# Patient Record
Sex: FEMALE | Race: WHITE | URBAN - METROPOLITAN AREA
[De-identification: names, ages, dates, MRNs, and addresses within clinical notes are randomized per-mention and may not be internally consistent; named-entity substitution may affect disease eponyms.]

---

## 2017-05-15 DIAGNOSIS — I10 HYPERTENSION GOAL BP (BLOOD PRESSURE) < 140/90: ICD-10-CM

## 2017-05-15 RX ORDER — ATENOLOL 50 MG/1
50 TABLET ORAL DAILY
Qty: 30 TABLET | Refills: 0 | Status: SHIPPED | OUTPATIENT
Start: 2017-05-15 | End: 2017-07-06

## 2017-05-15 NOTE — TELEPHONE ENCOUNTER
Atenolol 50mg      Last Written Prescription Date: 03/06/2017  Last Fill Quantity: 30, # refills: 0  Last Office Visit with G, P or [facility] prescribing provider: Changing to new clinic  Next 5 appointments (look out 90 days)     Jun 08, 2017 11:00 AM CDT   [facility]    [address] [facility] [id number] [phone]                   Potassium   Date Value Ref Range Status   07/19/2012 4.4 3.4 - 5.3 mmol/L Final     Creatinine   Date Value Ref Range Status   07/19/2012 0.94 0.52 - 1.04 mg/dL Final     BP Readings from Last 3 Encounters:   08/03/11 129/86   06/16/11 125/74   09/16/10 150/80     * [facility]   Ext #[phone], [phone]  #2

## 2017-05-15 NOTE — TELEPHONE ENCOUNTER
Refilled for 1 month as patient has not been seen within [facility] since 2012  Can refill with 3 month supply when patient seen at clinic appt June 8

## 2017-06-06 ENCOUNTER — TELEPHONE (OUTPATIENT)
Dept: FAMILY MEDICINE | Facility: CLINIC | Age: 67
End: 2017-06-06

## 2017-06-06 NOTE — TELEPHONE ENCOUNTER
Attempted to call patient at [phone] (home), no answer.  Left detailed message below, as advised in original message, and to call back to the RN line if any questions.    Gregoria Pace RN  [facility]

## 2017-06-06 NOTE — TELEPHONE ENCOUNTER
I'm not sure what is in the medication. If it's acetaminophen, then it's ok to take. That is a good medication for arthritic pain. Do not take more than 3,000 mg acetaminophen in a 24 hour period

## 2017-06-06 NOTE — TELEPHONE ENCOUNTER
Reason for Call:  Other medication question    Detailed comments: patient has questions regarding over the counter medication for arthritis pain, Arthritis Pain (patient bought at [facility] pharmacy). Patient wants to know if this is ok to take.    Phone Number Patient can be reached at: Home number on file [phone] (home)    Best Time: any    Can we leave a detailed message on this number? YES    Call taken on 6/6/2017 at 11:11 AM by Bruna Gutierrez

## 2017-06-06 NOTE — TELEPHONE ENCOUNTER
Reason for Call:  Other   Business Card    Detailed comments:  Patient called and states she has an appointment on 07/13/17 with Marie Webb.  Patient is requesting a business card for Marie be mailed to her home address.  Jennifer Butler  [address] [place] [address] [address]  [facility] [address]    Phone Number Patient can be reached at: Home number on file [phone] (home)    Best Time: Any    Can we leave a detailed message on this number? YES    Call taken on 6/6/2017 at 11:42 AM by Kat Campos

## 2017-06-06 NOTE — TELEPHONE ENCOUNTER
Patient called back without listening to her voice mail.  This is Tylenol.  RN informed of message as below, she agrees with plan of care.    Gregoria Pace RN  [facility]

## 2017-07-03 ENCOUNTER — TELEPHONE (OUTPATIENT)
Dept: FAMILY MEDICINE | Facility: CLINIC | Age: 67
End: 2017-07-03

## 2017-07-03 DIAGNOSIS — I10 HYPERTENSION GOAL BP (BLOOD PRESSURE) < 140/90: ICD-10-CM

## 2017-07-03 NOTE — TELEPHONE ENCOUNTER
atenolol (TENORMIN) 50 MG tablet      Last Written Prescription Date: 5-15-17  Last Fill Quantity: 30, # refills: 0    Last Office Visit with [facility], [facility] or [facility] prescribing provider:  ?   Future Office Visit:    Next 5 appointments (look out 90 days)     Aug 17, 2017 11:00 AM CDT   Office Visit with ABI Avery CNP   [facility] ([facility]    [address] [place] [id number] [phone]                    BP Readings from Last 3 Encounters:   08/03/11 129/86   06/16/11 125/74   09/16/10 150/80

## 2017-07-03 NOTE — TELEPHONE ENCOUNTER
Routing refill request to provider for review/approval because:  Janey given x1 and patient did not follow up, please advise    Betty Duran RN  [facility]

## 2017-07-05 RX ORDER — ATENOLOL 50 MG/1
TABLET ORAL
Qty: 30 TABLET | Refills: 0 | OUTPATIENT
Start: 2017-07-05

## 2017-07-06 RX ORDER — ATENOLOL 50 MG/1
50 TABLET ORAL DAILY
Qty: 45 TABLET | Refills: 0 | Status: SHIPPED | OUTPATIENT
Start: 2017-07-06 | End: 2017-09-26

## 2017-07-06 NOTE — TELEPHONE ENCOUNTER
Patient is scheduled 08/17/2017 for a check and would like enough medication until appointment. Bridget HamiltonTC

## 2017-07-13 DIAGNOSIS — R07.9 CHEST PAIN, UNSPECIFIED TYPE: Primary | ICD-10-CM

## 2017-07-13 RX ORDER — NITROGLYCERIN 0.4 MG/1
0.4 TABLET SUBLINGUAL EVERY 5 MIN PRN
Qty: 25 TABLET | Refills: 0 | Status: SHIPPED | OUTPATIENT
Start: 2017-07-13 | End: 2018-05-29

## 2017-07-13 NOTE — TELEPHONE ENCOUNTER
Nitrostat 0.4mg subl      Last Written Prescription Date:  06/16/11  Last Fill Quantity: 25,   # refills: 0  Last Office Visit with G, P or [facility] prescribing provider: 10/03/12  Future Office visit:    Next 5 appointments (look out 90 days)     Aug 17, 2017 11:00 AM CDT   Office Visit with ABI Avery CNP   [facility]    [address] [place] [id number] [phone]                   Routing refill request to provider for review/approval because:  Medication is reported/historical    On behalf of [facility]  Thank You~  Ana Black CPhT  [facility]

## 2017-08-02 ENCOUNTER — TELEPHONE (OUTPATIENT)
Dept: FAMILY MEDICINE | Facility: CLINIC | Age: 67
End: 2017-08-02

## 2017-08-02 DIAGNOSIS — I10 HYPERTENSION GOAL BP (BLOOD PRESSURE) < 140/90: Primary | ICD-10-CM

## 2017-08-02 NOTE — TELEPHONE ENCOUNTER
I can not refill again. I have never met her. Most recent office visit and labs are from 2012. She currently has enough atenolol to get through until mid August and should be able to schedule an appointment within that time frame. Otherwise, she could request a refill from previous clinic that she was receiving this prescription from.

## 2017-08-02 NOTE — TELEPHONE ENCOUNTER
Patient changed her appointment to 9/26. Please see last refill notes. Routing to PCP. Are you willing to refill again?   Carmel Maynard RN

## 2017-08-02 NOTE — TELEPHONE ENCOUNTER
Reason for Call:  Medication or medication refill:    Do you use a [facility]?  Name of the pharmacy and phone number for the current request:  [facility]     Name of the medication requested: Atenolol.  Patient states she only needs 20 tablets to get her through until her next appointment with Marie.  She changed her appointment to Tuesday, 9/26/17.      Other request: Please call the patient if there is a problem with her request.    Can we leave a detailed message on this number? YES    Phone number patient can be reached at: Cell number on file:    Telephone Information:   Mobile [phone]       Best Time: anytime    Call taken on 8/2/2017 at 9:44 AM by Lili Vale

## 2017-08-02 NOTE — TELEPHONE ENCOUNTER
Called patient and relayed below information - patient states has enough to get her through till her appoinment on the 26 th.    Margaret Morillo RN  [facility]

## 2017-09-26 ENCOUNTER — TELEPHONE (OUTPATIENT)
Dept: FAMILY MEDICINE | Facility: CLINIC | Age: 67
End: 2017-09-26

## 2017-09-26 RX ORDER — ATENOLOL 50 MG/1
50 TABLET ORAL DAILY
Qty: 14 TABLET | Refills: 0 | Status: SHIPPED | OUTPATIENT
Start: 2017-09-26 | End: 2017-10-13

## 2017-09-26 NOTE — TELEPHONE ENCOUNTER
Patient calling back regarding refill request. She only has enough medication to last until tomorrow. Patient is wondering if there is another provider that can refill this medication as she needs to have a  deliver her medication. Patient would like a phone call once refill has been addressed at [phone].

## 2017-09-26 NOTE — TELEPHONE ENCOUNTER
Called and spoke with patient, advised of message as below.  She agrees with and understands plan of care.      Gregoria Pace RN  [facility]

## 2017-09-26 NOTE — TELEPHONE ENCOUNTER
I sent 2 weeks supply to our clinic's pharmacy. I apologize that I had to cancel the appointment today. I will see her on the 9th of October. Please bring any recent medical records that you have to that appointment

## 2017-09-26 NOTE — TELEPHONE ENCOUNTER
Reason for call:  Patient reporting a symptom    Symptom or request: Patient has been having pain from her arthritis. Patient is wondering if it is safe to take both Tylenol and Ibuprofen to help treat the achiness. Patient currently only takes Tylenol as needed and waits awhile after taking her aspirin.     Duration (how long have symptoms been present): ongoing    Have you been treated for this before? No    Additional comments: Patient uses [facility]    Phone Number patient can be reached at:  Cell number on file:    Telephone Information:   Mobile [phone]       Best Time:  any    Can we leave a detailed message on this number:  YES    Call taken on 9/26/2017 at 10:11 AM by Gracy Ortiz

## 2017-09-26 NOTE — TELEPHONE ENCOUNTER
TC contacted patient and had to cancel patient's appointment today due to provider being out. Patient rescheduled her appointment for 10/09/2017. Patient will run out of this medication prior to appointment and is requesting that medication be refilled. She would like script sent to [facility]. She can be reached at home number listed with any questions.

## 2017-10-13 ENCOUNTER — OFFICE VISIT (OUTPATIENT)
Dept: FAMILY MEDICINE | Facility: CLINIC | Age: 67
End: 2017-10-13
Payer: COMMERCIAL

## 2017-10-13 ENCOUNTER — TELEPHONE (OUTPATIENT)
Dept: FAMILY MEDICINE | Facility: CLINIC | Age: 67
End: 2017-10-13

## 2017-10-13 VITALS
HEIGHT: 62 IN | TEMPERATURE: 97.6 F | OXYGEN SATURATION: 97 % | WEIGHT: 283 LBS | HEART RATE: 83 BPM | BODY MASS INDEX: 52.08 KG/M2 | DIASTOLIC BLOOD PRESSURE: 84 MMHG | SYSTOLIC BLOOD PRESSURE: 156 MMHG

## 2017-10-13 DIAGNOSIS — L60.2 HYPERTROPHY OF NAIL: ICD-10-CM

## 2017-10-13 DIAGNOSIS — Z12.31 VISIT FOR SCREENING MAMMOGRAM: ICD-10-CM

## 2017-10-13 DIAGNOSIS — Z00.00 HEALTH CARE MAINTENANCE: ICD-10-CM

## 2017-10-13 DIAGNOSIS — E78.5 HYPERLIPIDEMIA LDL GOAL <100: ICD-10-CM

## 2017-10-13 DIAGNOSIS — E11.9 TYPE 2 DIABETES MELLITUS WITHOUT COMPLICATION, WITHOUT LONG-TERM CURRENT USE OF INSULIN (H): ICD-10-CM

## 2017-10-13 DIAGNOSIS — Z11.59 NEED FOR HEPATITIS C SCREENING TEST: ICD-10-CM

## 2017-10-13 DIAGNOSIS — I10 HYPERTENSION, GOAL BELOW 140/90: Primary | ICD-10-CM

## 2017-10-13 DIAGNOSIS — I21.4 NSTEMI (NON-ST ELEVATED MYOCARDIAL INFARCTION) (H): ICD-10-CM

## 2017-10-13 DIAGNOSIS — I87.2 VENOUS STASIS DERMATITIS OF BOTH LOWER EXTREMITIES: ICD-10-CM

## 2017-10-13 LAB
ANION GAP SERPL CALCULATED.3IONS-SCNC: 7 MMOL/L (ref 3–14)
BUN SERPL-MCNC: 15 MG/DL (ref 7–30)
CALCIUM SERPL-MCNC: 9.2 MG/DL (ref 8.5–10.1)
CHLORIDE SERPL-SCNC: 105 MMOL/L (ref 94–109)
CHOLEST SERPL-MCNC: 226 MG/DL
CO2 SERPL-SCNC: 26 MMOL/L (ref 20–32)
CREAT SERPL-MCNC: 1.06 MG/DL (ref 0.52–1.04)
GFR SERPL CREATININE-BSD FRML MDRD: 51 ML/MIN/1.7M2
GLUCOSE SERPL-MCNC: 136 MG/DL (ref 70–99)
HBA1C MFR BLD: 6.3 % (ref 4.3–6)
HDLC SERPL-MCNC: 50 MG/DL
HGB BLD-MCNC: 15.4 G/DL (ref 11.7–15.7)
LDLC SERPL CALC-MCNC: 131 MG/DL
NONHDLC SERPL-MCNC: 176 MG/DL
POTASSIUM SERPL-SCNC: 4.1 MMOL/L (ref 3.4–5.3)
SODIUM SERPL-SCNC: 138 MMOL/L (ref 133–144)
TRIGL SERPL-MCNC: 227 MG/DL
TSH SERPL DL<=0.005 MIU/L-ACNC: 2.08 MU/L (ref 0.4–4)

## 2017-10-13 PROCEDURE — 99207 C FOOT EXAM  NO CHARGE: CPT | Performed by: NURSE PRACTITIONER

## 2017-10-13 PROCEDURE — 36415 COLL VENOUS BLD VENIPUNCTURE: CPT | Performed by: NURSE PRACTITIONER

## 2017-10-13 PROCEDURE — 84443 ASSAY THYROID STIM HORMONE: CPT | Performed by: NURSE PRACTITIONER

## 2017-10-13 PROCEDURE — 80048 BASIC METABOLIC PNL TOTAL CA: CPT | Performed by: NURSE PRACTITIONER

## 2017-10-13 PROCEDURE — 85018 HEMOGLOBIN: CPT | Performed by: NURSE PRACTITIONER

## 2017-10-13 PROCEDURE — 80061 LIPID PANEL: CPT | Performed by: NURSE PRACTITIONER

## 2017-10-13 PROCEDURE — 83036 HEMOGLOBIN GLYCOSYLATED A1C: CPT | Performed by: NURSE PRACTITIONER

## 2017-10-13 PROCEDURE — 86803 HEPATITIS C AB TEST: CPT | Performed by: NURSE PRACTITIONER

## 2017-10-13 PROCEDURE — 99204 OFFICE O/P NEW MOD 45 MIN: CPT | Performed by: NURSE PRACTITIONER

## 2017-10-13 RX ORDER — HYDROCHLOROTHIAZIDE 12.5 MG/1
12.5 TABLET ORAL DAILY
Qty: 30 TABLET | Refills: 0 | Status: SHIPPED | OUTPATIENT
Start: 2017-10-13 | End: 2017-11-15

## 2017-10-13 RX ORDER — PRAVASTATIN SODIUM 40 MG
40 TABLET ORAL DAILY
Qty: 30 TABLET | Refills: 0 | Status: SHIPPED | OUTPATIENT
Start: 2017-10-13 | End: 2017-11-15

## 2017-10-13 RX ORDER — ATENOLOL 50 MG/1
50 TABLET ORAL DAILY
Qty: 30 TABLET | Refills: 0 | Status: SHIPPED | OUTPATIENT
Start: 2017-10-13 | End: 2017-11-15

## 2017-10-13 ASSESSMENT — PAIN SCALES - GENERAL: PAINLEVEL: NO PAIN (0)

## 2017-10-13 NOTE — TELEPHONE ENCOUNTER
She does not need to check her blood sugars this week  We can discuss this at her visit next week  Her A1c was at goal which means her diabetes is well controlled through diet

## 2017-10-13 NOTE — TELEPHONE ENCOUNTER
"Called and spoke with patient, advised of message as below.  She first states that she doesn't have diabetes, RN informed her she still does but is well controlled through diet.  Patient then goes on to list the meal she had last night, that she will eat only 1 slice of bread instead of 2, and plans to lose 20-30 pounds by next year.      She then requests 90 days of Atenolol.  RN informed there is a shortage so even if we sent 90 days the pharmacy might only supply 30.  Also her insurance would not pay for it right now.  PCP informed RN via [facility] that she wants patient to f/up and will not give 90 days.    She states that the HCTZ has really decreased her swelling and she is \"prancing around the house.\"    RN confirmed patient will be in for follow up, changed appointment to 40 min appointment as patient has really involved cares and does not seem to understand things the first time around.    Gregoria Pace RN  [facility]    "

## 2017-10-13 NOTE — TELEPHONE ENCOUNTER
Reason for Call:  Other call back    Detailed comments: Patient calling to discuss her diabetes diagnosis, medications, and lab results. She is worried she wont get her medication on time and says she can just use her husbands diabetes reader to prick her finger to get a reading.   Phone Number Patient can be reached at: Home number on file [phone] (home)    Best Time: any     Can we leave a detailed message on this number? YES    Call taken on 10/13/2017 at 2:02 PM by Yulissa Eller

## 2017-10-13 NOTE — MR AVS SNAPSHOT
After Visit Summary   10/13/2017    Deanna Butler    MRN: [id number]           Patient Information     Date Of Birth          1950        Visit Information        Provider Department      10/13/2017 10:40 AM Marie Webb APRN [facility]        Today's Diagnoses     Hypertension, goal below 140/90    -  1    NSTEMI (non-ST elevated myocardial infarction) (H)        Asymptomatic postmenopausal status        Visit for screening mammogram        Need for hepatitis C screening test        At risk for falling        Screening for diabetic peripheral neuropathy        Type 2 diabetes mellitus without complication, without long-term current use of insulin (H)        Hyperlipidemia LDL goal <100        Health care maintenance        Hypertrophy of nail        Venous stasis dermatitis of both lower extremities        Hypertension goal BP (blood pressure) < 140/90          Care Instructions    Start taking Pravachol at bedtime for your cholesterol  I started a new medication called Hydrochlorothiazide. Take 1 tablet daily in the morning with your atenolol  Follow up in 1 week    Schedule appointment with podiatrist          Follow-ups after your visit        Additional Services     PODIATRY/FOOT & ANKLE SURGERY REFERRAL       Your provider has referred you to: [facility]: [place] [facility] [phone]   [email or web address]    Please be aware that coverage of these services is subject to the terms and limitations of your health insurance plan.  Call member services at your health plan with any benefit or coverage questions.      Please bring the following to your appointment:  >>   Any x-rays, CTs or MRIs which have been performed.  Contact the facility where they were done to arrange for  prior to your scheduled appointment.    >>   List of current medications   >>   This referral request   >>   Any  "documents/labs given to you for this referral                  Future tests that were ordered for you today     Open Future Orders        Priority Expected Expires Ordered    MA SCREENING DIGITAL BILAT - Future  (s+30) Routine  10/13/2018 10/13/2017            Who to contact     If you have questions or need follow up information about today's clinic visit or your schedule please contact [facility] directly at [phone].  Normal or non-critical lab and imaging results will be communicated to you by i[employer] CRM[employer], letter or phone within 4 business days after the clinic has received the results. If you do not hear from us within 7 days, please contact the clinic through [employer] or phone. If you have a critical or abnormal lab result, we will notify you by phone as soon as possible.  Submit refill requests through [employer] or call your pharmacy and they will forward the refill request to us. Please allow 3 business days for your refill to be completed.          Additional Information About Your Visit        iConnect CRMharLoudr Information     [employer] gives you secure access to your electronic health record. If you see a primary care provider, you can also send messages to your care team and make appointments. If you have questions, please call your primary care clinic.  If you do not have a primary care provider, please call [phone] and they will assist you.        Care EveryWhere ID     This is your Care EveryWhere ID. This could be used by other organizations to access your [facility] medical records  [id number]        Your Vitals Were     Pulse Temperature Height Pulse Oximetry Breastfeeding? BMI (Body Mass Index)    83 97.6  F (36.4  C) (Oral) 5' 2\" (1.575 m) 97% No 51.76 kg/m2       Blood Pressure from Last 3 Encounters:   10/13/17 156/84   08/03/11 129/86   06/16/11 125/74    Weight from Last 3 Encounters:   10/13/17 283 lb (128.4 kg)   06/16/11 236 lb (107 kg)   09/16/10 238 lb (108 kg)            "   We Performed the Following     Albumin Random Urine Quantitative with Creat Ratio     BASIC METABOLIC PANEL     FOOT EXAM  NO CHARGE [00775.114]     HEMOGLOBIN A1C     Hemoglobin     Hepatitis C Screen Reflex to HCV RNA Quant and Genotype     LIPID PANEL     PODIATRY/FOOT & ANKLE SURGERY REFERRAL     TSH WITH FREE T4 REFLEX          Today's Medication Changes          These changes are accurate as of: 10/13/17 10:45 AM.  If you have any questions, ask your nurse or doctor.               Start taking these medicines.        Dose/Directions    hydrochlorothiazide 12.5 MG Tabs tablet   Used for:  Hypertension, goal below 140/90   Started by:  Marie Webb APRN CNP        Dose:  12.5 mg   Take 1 tablet (12.5 mg) by mouth daily   Quantity:  30 tablet   Refills:  0         These medicines have changed or have updated prescriptions.        Dose/Directions    aspirin  MG EC tablet   This may have changed:  Another medication with the same name was removed. Continue taking this medication, and follow the directions you see here.   Used for:  Type 2 diabetes mellitus without complication, without long-term current use of insulin (H)   Changed by:  Marie Webb APRN CNP        Dose:  325 mg   Take 1 tablet (325 mg) by mouth daily   Quantity:  90 tablet   Refills:  3            Where to get your medicines      These medications were sent to [facility] [address]  [address] [facility] [address]     Phone:  [phone]     atenolol 50 MG tablet    hydrochlorothiazide 12.5 MG Tabs tablet    pravastatin 40 MG tablet                Primary Care Provider Office Phone # Fax #    ABI Avery [facility] [phone]       [address] [facility] [address]        Equal Access to Services     MARAH FERRER AH: Vi Harrell, maria eugenia swift, cheyenne gastelum  pola schmidtsugar laKiaraaan ah. So [facility] [phone].    ATENCIÓN: Si habla imtiaz, tiene a carlisle disposición servicios gratuitos de asistencia lingüística. Toya marroquin [phone].    We comply with applicable federal civil rights laws and [place] laws. We do not discriminate on the basis of race, color, national origin, age, disability, sex, sexual orientation, or gender identity.            Thank you!     Thank you for choosing [facility]  for your care. Our goal is always to provide you with excellent care. Hearing back from our patients is one way we can continue to improve our services. Please take a few minutes to complete the written survey that you may receive in the mail after your visit with us. Thank you!             Your Updated Medication List - Protect others around you: Learn how to safely use, store and throw away your medicines at www.disposemymeds.org.          This list is accurate as of: 10/13/17 10:45 AM.  Always use your most recent med list.                   Brand Name Dispense Instructions for use Diagnosis    aspirin  MG EC tablet     90 tablet    Take 1 tablet (325 mg) by mouth daily    Type 2 diabetes mellitus without complication, without long-term current use of insulin (H)       atenolol 50 MG tablet    TENORMIN    30 tablet    Take 1 tablet (50 mg) by mouth daily    Hypertension goal BP (blood pressure) < 140/90       cetirizine 10 MG tablet    zyrTEC    90 tablet    Take 1 tablet by mouth every evening.    Cough       cholecalciferol 400 UNIT Tabs tablet    vitamin D     Take 400 Units by mouth daily.        fish oil-omega-3 fatty acids 1000 MG capsule      Take 2 g by mouth daily.        hydrochlorothiazide 12.5 MG Tabs tablet     30 tablet    Take 1 tablet (12.5 mg) by mouth daily    Hypertension, goal below 140/90       nitroGLYcerin 0.4 MG sublingual tablet    NITROSTAT    25 tablet    Place 1 tablet (0.4 mg) under the tongue every 5 minutes as needed    Chest  pain, unspecified type       pravastatin 40 MG tablet    PRAVACHOL    30 tablet    Take 1 tablet (40 mg) by mouth daily    Hyperlipidemia LDL goal <100, Type 2 diabetes mellitus without complication, without long-term current use of insulin (H)

## 2017-10-13 NOTE — NURSING NOTE
"Chief Complaint   Patient presents with     Hypertension       Initial /80 (BP Location: Right arm, Patient Position: Chair, Cuff Size: Adult Large)  Pulse 83  Temp 97.6  F (36.4  C) (Oral)  Ht 5' 2\" (1.575 m)  Wt 283 lb (128.4 kg)  SpO2 97%  Breastfeeding? No  BMI 51.76 kg/m2 Estimated body mass index is 51.76 kg/(m^2) as calculated from the following:    Height as of this encounter: 5' 2\" (1.575 m).    Weight as of this encounter: 283 lb (128.4 kg).  Medication Reconciliation: complete.  JOHNNA Guillen MA      "

## 2017-10-13 NOTE — PATIENT INSTRUCTIONS
Start taking Pravachol at bedtime for your cholesterol  I started a new medication called Hydrochlorothiazide. Take 1 tablet daily in the morning with your atenolol  Follow up in 1 week    Schedule appointment with podiatrist

## 2017-10-13 NOTE — PROGRESS NOTES
SUBJECTIVE:   Deanna Butler is a 67 year old female who presents to clinic today for the following health issues:      Hypertension Follow-up      Outpatient blood pressures are not being checked.    Low Salt Diet: not monitoring salt    Amount of exercise or physical activity: 4-5 days/week for an average of less than 15 minutes    Problems taking medications regularly: No    Medication side effects: none  Diet: regular (no restrictions)    She was previously following at [facility] and would like to transfer care here    She is trying to restrict her sodium use  Is trying to lose weight. Goal weight loss of 40 pounds    She reports she has been taking atenolol though her last refill was 9/26/17 for 14 days and she would have run out by now  I am not sure where she was receiving atenolol over past few years    She denies history of history of diabetes  A1c 6.8 in 2012    NSTEMI 2011  She is no longer on Plavis  Reports taking 325 mg aspirin daily    She denies any concerns      Problem list and histories reviewed & adjusted, as indicated.  Additional history: none    Patient Active Problem List   Diagnosis     CAD (coronary artery disease)     Hyperlipidemia LDL goal <100     CKD (chronic kidney disease) stage 3, GFR 30-59 ml/min     Hypertension goal BP (blood pressure) < 140/90     Type 2 diabetes, HbA1C goal < 8% (H)     History reviewed. No pertinent surgical history.    Social History   Substance Use Topics     Smoking status: Former Smoker     Quit date: 9/16/1980     Smokeless tobacco: Never Used     Alcohol use No      Comment: occasional wine     Family History   Problem Relation Age of Onset     Hypertension Mother      Arthritis Mother      CANCER Father      CANCER Brother      HEART DISEASE Sister          Recent Labs   Lab Test  10/13/17   1055  07/19/12   1031  09/16/10   1150   A1C  6.3*  6.8*  6.5*   LDL   --   72  147*   HDL   --   47*  49*   TRIG   --   179*  183*   ALT   --   43  45   CR   --  "  0.94  1.06*   GFRESTIMATED   --   60*  53*   GFRESTBLACK   --   73  64   POTASSIUM   --   4.4  4.5   TSH   --   1.71  1.49      BP Readings from Last 3 Encounters:   10/13/17 156/84   08/03/11 129/86   06/16/11 125/74    Wt Readings from Last 3 Encounters:   10/13/17 283 lb (128.4 kg)   06/16/11 236 lb (107 kg)   09/16/10 238 lb (108 kg)             Reviewed and updated as needed this visit by clinical staffTobacco  Meds  Med Hx  Surg Hx  Fam Hx  Soc Hx      Reviewed and updated as needed this visit by Provider         ROS:  Constitutional, HEENT, cardiovascular, pulmonary, gi and gu systems are negative, except as otherwise noted.      OBJECTIVE:   /84  Pulse 83  Temp 97.6  F (36.4  C) (Oral)  Ht 5' 2\" (1.575 m)  Wt 283 lb (128.4 kg)  SpO2 97%  Breastfeeding? No  BMI 51.76 kg/m2  Body mass index is 51.76 kg/(m^2).  GENERAL: healthy, alert and no distress  NECK: no adenopathy, no asymmetry, masses, or scars and thyroid normal to palpation  RESP: lungs clear to auscultation - no rales, rhonchi or wheezes  CV: regular rate and rhythm, normal S1 S2, no S3 or S4, no murmur, click or rub, moderate non pitting edema and peripheral pulses strong  ABDOMEN: soft, nontender, without hepatosplenomegaly or masses and bowel sounds normal  MS: gait steady and age appropriate  SKIN: venous stasis changes bilateral lower extremities  NEURO: Normal strength and tone, mentation intact and speech normal  PSYCH: affect normal/bright and judgement and insight impaired  Foot exam - bilateral: Color and temperature is normal. Sensation is intact. Peripheral pulses are palpable. Lengthened, hypertrophic toenails  Wearing hospital socks and fleece slippers. Unable to reapply socks and slippers independently      Diagnostic Test Results:  none     ASSESSMENT/PLAN:   1. Hypertension, goal below 140/90  - Not at goal. I question her adherence to atenolol. She reports taking daily though per our records she should have run " out. Will add diuretic for improved BP control and for edema. Follow up in 1 week  - BASIC METABOLIC PANEL  - Hemoglobin  - hydrochlorothiazide 12.5 MG TABS tablet; Take 1 tablet (12.5 mg) by mouth daily  Dispense: 30 tablet; Refill: 0  - atenolol (TENORMIN) 50 MG tablet; Take 1 tablet (50 mg) by mouth daily  Dispense: 30 tablet; Refill: 0    2. Type 2 diabetes mellitus without complication, without long-term current use of insulin (H)  - She denies history of diabetes. I reviewed past labs. She is currently not on any medications for diabetes. Will recheck A1c, add statin. Discussed risks and benefits of statin  - FOOT EXAM  NO CHARGE [86520.114]  - BASIC METABOLIC PANEL  - HEMOGLOBIN A1C  - aspirin  MG EC tablet; Take 1 tablet (325 mg) by mouth daily  Dispense: 90 tablet; Refill: 3  - pravastatin (PRAVACHOL) 40 MG tablet; Take 1 tablet (40 mg) by mouth daily  Dispense: 30 tablet; Refill: 0  - Albumin Random Urine Quantitative with Creat Ratio; Future    3. NSTEMI (non-ST elevated myocardial infarction) (H)  - 2011. Currently asymptomatic. Does not exercise  - Advised regular exercise. She is taking baby aspirin. Will add statin. Previous records not available. She reports seeing cardiology in the past. I don't see record of this. Recommend cardiology consult. She declines at the moment. It appears as though she has not been following within a health system for several years. Will need to address measures slowly as she appears very overwhelmed    4. Visit for screening mammogram  - MA SCREENING DIGITAL BILAT - Future  (s+30); Future    5. Need for hepatitis C screening test  - Hepatitis C Screen Reflex to HCV RNA Quant and Genotype    6. Hyperlipidemia LDL goal <100  - LIPID PANEL  - pravastatin (PRAVACHOL) 40 MG tablet; Take 1 tablet (40 mg) by mouth daily  Dispense: 30 tablet; Refill: 0    7. Health care maintenance  - TSH WITH FREE T4 REFLEX    8. Hypertrophy of nail  - Referred to [facility] by [facility].  Business card given    9. Venous stasis dermatitis of both lower extremities  - Discussed importance of exercise, healthy diet, medication adherence, elevation and sodium restriction      Patient Instructions   Start taking Pravachol at bedtime for your cholesterol  I started a new medication called Hydrochlorothiazide. Take 1 tablet daily in the morning with your atenolol  Follow up in 1 week    Schedule appointment with podiatrist    More than 45 minutes spent with patient, more than 50% of which was spent on counseling and coordination of care regarding the above measures    ABI Bautista [facility]

## 2017-10-13 NOTE — TELEPHONE ENCOUNTER
RN sees A1c is resulted but not reviewed.  Metformin is listed on patient's med list, but last taken in 2011.  Was diabetes discussed at office visit today?    Routed to PCP.    Gregoria Pace RN  [facility]

## 2017-10-15 LAB — HCV AB SERPL QL IA: NONREACTIVE

## 2017-10-16 ENCOUNTER — TELEPHONE (OUTPATIENT)
Dept: FAMILY MEDICINE | Facility: CLINIC | Age: 67
End: 2017-10-16

## 2017-10-16 PROBLEM — I87.2 VENOUS STASIS DERMATITIS OF BOTH LOWER EXTREMITIES: Status: ACTIVE | Noted: 2017-10-16

## 2017-10-16 NOTE — TELEPHONE ENCOUNTER
Marie - please call her.  I already discussed all of this with her, in great detail, in the previous encounter.    Gregoria Pace RN  [facility]

## 2017-10-16 NOTE — TELEPHONE ENCOUNTER
She does need the medications for hypertension: atenolol and HCTZ. I will send in 90 day supplies once her symptoms are well controlled. Until then, we will do 30 days at a time  We can discuss the rest of her concerns on Friday. She was advised at our clinic visit to follow up in 1 week.   I will not be giving additional refills of medications without followup

## 2017-10-16 NOTE — TELEPHONE ENCOUNTER
Called and spoke with patient.  Informed of this and reviewed last Friday's conversation with her.  Patient verbalized understanding of this, this time.  She did verbally tangent off on her medications and how they make her feel better, also discussing foods.  RN advised to make a list of foods and concerns and bring that to her appointment.        Gregoria Pace RN  [facility]

## 2017-10-16 NOTE — TELEPHONE ENCOUNTER
"Reason for Call:  Other call back    Detailed comments: patient states water pill are helping her feel \"less bloated at the feet\" she states she is walking 4 times a day, she fees like is already having results on weight loss. Patient states she is watching her diet, looking more at labels and sodium amounts, but she would like some general guidance on nutritious foods. States she had read that bananas are and aren't good for health, she would like clarification. Patient is also wondering about the BP pills 90 day supply, she states she can pay out of pocket for them. She would also like to know if apt in November is needed.    Phone Number Patient can be reached at: Home number on file [phone] (home)    Best Time: anytime    Can we leave a detailed message on this number? YES    Call taken on 10/16/2017 at 8:43 AM by Mary Perez    "

## 2017-10-20 ENCOUNTER — TELEPHONE (OUTPATIENT)
Dept: FAMILY MEDICINE | Facility: CLINIC | Age: 67
End: 2017-10-20

## 2017-10-20 DIAGNOSIS — I10 HYPERTENSION GOAL BP (BLOOD PRESSURE) < 140/90: Primary | ICD-10-CM

## 2017-10-20 NOTE — TELEPHONE ENCOUNTER
Reason for Call:  Other appointment    Detailed comments: Patient states that she is doing great, she goes on walks 4 times a day, is eating healthy, and feels amazing. She says that she has to pay $40 everytime she comes in for an appointment and was hoping that she could get her blood pressure medication refilled without the follow up appointment on 11/10. Please call to discuss.    Phone Number Patient can be reached at: Cell number on file:    Telephone Information:   Mobile [phone]       Best Time: Patient would like to speak with someone today    Can we leave a detailed message on this number? YES    Call taken on 10/20/2017 at 3:02 PM by Gus Benedict

## 2017-10-23 NOTE — TELEPHONE ENCOUNTER
Patient does need to be seen in clinic as diuretic was started at last visit d/t HTN.  Offered for her to do RN check but if BP remained high, would need to see me if BP remained elevated  She stated she preferred to keep appointment on 11/10

## 2017-10-23 NOTE — TELEPHONE ENCOUNTER
Patient calling back to check to see if anyone has called her yet.  Please call her back at number [phone].    Thank you!  Gretchen FRANKLIN  Patient Representative  [facility]

## 2017-10-24 ENCOUNTER — TELEPHONE (OUTPATIENT)
Dept: FAMILY MEDICINE | Facility: CLINIC | Age: 67
End: 2017-10-24

## 2017-10-24 NOTE — TELEPHONE ENCOUNTER
Reason for Call:  Other     Detailed comments: patient called and would like to talk about her cholesterol      Phone Number Patient can be reached at: Home number on file [phone] (home)    Best Time: any    Can we leave a detailed message on this number? YES    Call taken on 10/24/2017 at 8:03 AM by Dayana Nguyne

## 2017-10-24 NOTE — TELEPHONE ENCOUNTER
Called patient. She was wanting the value for her cholesterol:     Cholesterol 226     Triglycerides 227     LDL Cholesterol Calculated 131     Patient states she is taking the Pravastatin 40 mg but is concerned since the medication she received says Pravastatin Sodium. She is concerned that the sodium can affect her heart. Even though she does not have any side effects from the medication she would like to know if she should be concerned about the sodium or if there is another medication she should be on. Patient also says that she has labs ordered but she does not want to have them done. She says she does not have any concerns about her kidney function if that was what the test was for.     Routed to provider to advise.  Adrianna Casey RN  [facility]

## 2017-10-24 NOTE — TELEPHONE ENCOUNTER
Patient called pharmacy and talked to the pharmacist and he said the medication is fine for her to take with her heart problem.    Patient does not needs a phone call back because she got her question answered by the pharmacist.    Jolene Ortega

## 2017-11-02 ENCOUNTER — TELEPHONE (OUTPATIENT)
Dept: FAMILY MEDICINE | Facility: CLINIC | Age: 67
End: 2017-11-02

## 2017-11-02 DIAGNOSIS — I10 HYPERTENSION, GOAL BELOW 140/90: ICD-10-CM

## 2017-11-02 DIAGNOSIS — E78.5 HYPERLIPIDEMIA LDL GOAL <100: ICD-10-CM

## 2017-11-02 DIAGNOSIS — E11.9 TYPE 2 DIABETES MELLITUS WITHOUT COMPLICATION, WITHOUT LONG-TERM CURRENT USE OF INSULIN (H): ICD-10-CM

## 2017-11-02 RX ORDER — PRAVASTATIN SODIUM 40 MG
40 TABLET ORAL DAILY
Qty: 90 TABLET | Refills: 0 | Status: CANCELLED | OUTPATIENT
Start: 2017-11-02

## 2017-11-02 RX ORDER — HYDROCHLOROTHIAZIDE 12.5 MG/1
12.5 TABLET ORAL DAILY
Qty: 90 TABLET | Refills: 0 | Status: CANCELLED | OUTPATIENT
Start: 2017-11-02

## 2017-11-02 RX ORDER — ATENOLOL 50 MG/1
50 TABLET ORAL DAILY
Qty: 90 TABLET | Refills: 0 | Status: CANCELLED | OUTPATIENT
Start: 2017-11-02

## 2017-11-02 NOTE — TELEPHONE ENCOUNTER
I called patient and advised her of Marie's response.   She is arguing with me about why she has to be seen and have labs again.    She keeps telling me she is peeing normally; I keep telling her that peeing is not a good indicator of kidney function and that we need to check for possible electrolyte imbalances due to starting the HCTZ.    She feels we are being unreasonable and she cooperated with the lab work.      She finally agrees to keep her appointment; says she thinks she might not take the pravastatin if her cholesterol is better when she comes back.   I advised her I am not sure she was planning to recheck her cholesterol already.    She says she is exercising and eating low sodium food, eating healthier.   I advised her to have her cholesterol checked at her new doctor.      She also says she is not going to sit on the toilet and leave any pee tests either.   All she will do is the blood test for her kidneys.    She is upset about not getting a call regarding her recent lab work.   I told her the results were likely sent to her MyChart; she says she does not use Mychart and wants this deactivated.    I asked her about the mammogram, she absolutely refuses this, not swayed by promise of free test.      Routed to  to deactivate her Mychart.    Betty Duran RN  [facility]

## 2017-11-02 NOTE — TELEPHONE ENCOUNTER
I will not be refilling these medications  Patient does need to be seen in clinic. We discussed this at her last visit  Her BP has been poorly controlled. We started her on a new medication. She needs to be seen to assess whether BP has improved and to monitor for tolerance to new medications. She should not wait until January to do this  If her blood pressure is well controlled when she comes in on Nov 10, I will give enough in refill until she can be seen by new provider in January  Please also let patient know about [facility] on 11/6 and see if she will schedule for it. It's a free mammogram with giveaways

## 2017-11-02 NOTE — TELEPHONE ENCOUNTER
"        Atenolol, pravastatin, HCTZ     Last Written Prescription Date: 10/13/17 for all  Last Fill Quantity: 30,  # refills: 0   Last Office Visit with G, P or [facility] prescribing provider: 10/13/17, advised to follow up in 1 week, not done, scheduled for 11/10/17 instead                                         Next 5 appointments (look out 90 days)     Nov 10, 2017 10:40 AM CST   [facility] C[facility]    [address] [address]   [phone]                  BP Readings from Last 3 Encounters:   10/13/17 156/84   08/03/11 129/86   06/16/11 125/74     Creatinine   Date Value Ref Range Status   10/13/2017 1.06 (H) 0.52 - 1.04 mg/dL Final   ]  Potassium   Date Value Ref Range Status   10/13/2017 4.1 3.4 - 5.3 mmol/L Final   ]  LDL Cholesterol Calculated   Date Value Ref Range Status   10/13/2017 131 (H) <100 mg/dL Final     Comment:     Above desirable:  100-129 mg/dl  Borderline High:  130-159 mg/dL  High:             160-189 mg/dL  Very high:       >189 mg/dl     ]    She should not run out of her meds until then.  I called patient to discuss, she says she is planning to switch clinics after Jan 1st.  She is hoping she won't have to be seen on Nov 10 as she is going to be seen elsewhere in 2018.  She is hoping Marie can refill these meds to get her into the new year, she feels there is no need for her to spend the $45 to be seen again when she is doing well on the meds.  She is keeping her appt as is until she finds out if Marie will fill these but she would like to cancel and \"let someone who really needs the appointment have a chance to come in\".    Routing refill request to provider for review/approval because:  Labs out of range:  Creatinine, ldl, elevated BP      Betty Duran RN  [facility]        "

## 2017-11-08 NOTE — TELEPHONE ENCOUNTER
Patient calling back. She states that she called and cancelled her appointment. She wants Marie to provide her with the medications that she needs to get her through until she establishes care with a new provider.

## 2017-11-08 NOTE — TELEPHONE ENCOUNTER
Patient calling regarding these refill requests. She states that she is out of her medications. TC reviewed message below and informed patient that provider would not refill medications without being seen in clinic. Patient argued with TC about the need of this appointment. Patient complaining about how cold it is out and she thinks she will be exposed to the flu if she comes to the clinic. Patient can be reached at home number listed in EPIC.

## 2017-11-08 NOTE — TELEPHONE ENCOUNTER
See previous entries in this encounter.   I have already spoken to this patient at length, TC says she also brought up the same concerns when she talked to her just now (does not want to expose herself to clinic germs, she feels fine and sees no reason to see provider, etc).    Apparently she is out of her meds and has now cancelled her appointment that I convinced her to attend previously.    Routed to Marie Webb to advise.    Betty Duran RN  [facility]

## 2017-11-09 NOTE — TELEPHONE ENCOUNTER
I spoke with patient. I will not be refilling without an office visit. I repeated myself several times as I did with past telephone encounters. If patient calls again please let her know we will have our patient care supervisor call her.

## 2017-11-15 ENCOUNTER — OFFICE VISIT (OUTPATIENT)
Dept: FAMILY MEDICINE | Facility: CLINIC | Age: 67
End: 2017-11-15
Payer: COMMERCIAL

## 2017-11-15 VITALS
BODY MASS INDEX: 52.63 KG/M2 | DIASTOLIC BLOOD PRESSURE: 83 MMHG | SYSTOLIC BLOOD PRESSURE: 145 MMHG | WEIGHT: 286 LBS | HEART RATE: 77 BPM | HEIGHT: 62 IN | TEMPERATURE: 97.3 F

## 2017-11-15 DIAGNOSIS — E11.9 TYPE 2 DIABETES MELLITUS WITHOUT COMPLICATION, WITHOUT LONG-TERM CURRENT USE OF INSULIN (H): ICD-10-CM

## 2017-11-15 DIAGNOSIS — Z23 NEED FOR PROPHYLACTIC VACCINATION AND INOCULATION AGAINST INFLUENZA: ICD-10-CM

## 2017-11-15 DIAGNOSIS — E78.5 HYPERLIPIDEMIA LDL GOAL <100: ICD-10-CM

## 2017-11-15 DIAGNOSIS — I25.10 CORONARY ARTERY DISEASE INVOLVING NATIVE HEART WITHOUT ANGINA PECTORIS, UNSPECIFIED VESSEL OR LESION TYPE: ICD-10-CM

## 2017-11-15 DIAGNOSIS — I10 HYPERTENSION, GOAL BELOW 140/90: Primary | ICD-10-CM

## 2017-11-15 DIAGNOSIS — Z13.5 SCREENING FOR DIABETIC RETINOPATHY: ICD-10-CM

## 2017-11-15 DIAGNOSIS — M25.532 LEFT WRIST PAIN: ICD-10-CM

## 2017-11-15 PROCEDURE — 99214 OFFICE O/P EST MOD 30 MIN: CPT | Performed by: FAMILY MEDICINE

## 2017-11-15 RX ORDER — HYDROCHLOROTHIAZIDE 12.5 MG/1
12.5 TABLET ORAL DAILY
Qty: 90 TABLET | Refills: 1 | Status: SHIPPED | OUTPATIENT
Start: 2017-11-15 | End: 2018-04-23

## 2017-11-15 RX ORDER — CARVEDILOL 12.5 MG/1
12.5 TABLET ORAL 2 TIMES DAILY WITH MEALS
Qty: 180 TABLET | Refills: 0 | Status: SHIPPED | OUTPATIENT
Start: 2017-11-15 | End: 2017-12-21 | Stop reason: ALTCHOICE

## 2017-11-15 RX ORDER — ATENOLOL 50 MG/1
50 TABLET ORAL DAILY
Qty: 30 TABLET | Refills: 0 | Status: CANCELLED | OUTPATIENT
Start: 2017-11-15

## 2017-11-15 RX ORDER — PRAVASTATIN SODIUM 40 MG
40 TABLET ORAL DAILY
Qty: 90 TABLET | Refills: 1 | Status: SHIPPED | OUTPATIENT
Start: 2017-11-15 | End: 2018-04-23

## 2017-11-15 RX ORDER — NITROGLYCERIN 0.4 MG/1
TABLET SUBLINGUAL
Qty: 25 TABLET | Refills: 0 | Status: SHIPPED | OUTPATIENT
Start: 2017-11-15 | End: 2018-08-29

## 2017-11-15 NOTE — MR AVS SNAPSHOT
After Visit Summary   11/15/2017    Deanna Butler    MRN: [id number]           Patient Information     Date Of Birth          1950        Visit Information        Provider Department      11/15/2017 10:20 AM Tracie Antonio MD [facility]        Today's Diagnoses     Hypertension, goal below 140/90    -  1    Coronary artery disease involving native heart without angina pectoris, unspecified vessel or lesion type        Hyperlipidemia LDL goal <100        Type 2 diabetes mellitus without complication, without long-term current use of insulin (H)        Screening for diabetic retinopathy        Left wrist pain        Need for prophylactic vaccination and inoculation against influenza          Care Instructions    Check your BP at home, if most of your BPs are more than 140 /90 then follow up with provider.           Follow-ups after your visit        Additional Services     OPHTHALMOLOGY ADULT REFERRAL       Your provider has referred you to: [facility]: [facility] [phone]   [email or web address]    Please be aware that coverage of these services is subject to the terms and limitations of your health insurance plan.  Call member services at your health plan with any benefit or coverage questions.      Please bring the following with you to your appointment:    (1) Any X-Rays, CTs or MRIs which have been performed.  Contact the facility where they were done to arrange for  prior to your scheduled appointment.    (2) List of current medications  (3) This referral request   (4) Any documents/labs given to you for this referral                  Future tests that were ordered for you today     Open Future Orders        Priority Expected Expires Ordered    XR Wrist Left G/E 3 Views Routine 11/15/2017 11/15/2018 11/15/2017            Who to contact     If you have questions or need follow up information about today's  "clinic visit or your schedule please contact [facility] directly at [phone].  Normal or non-critical lab and imaging results will be communicated to you by MyChart, letter or phone within 4 business days after the clinic has received the results. If you do not hear from us within 7 days, please contact the clinic through [facility] [employer] or phone. If you have a critical or abnormal lab result, we will notify you by phone as soon as possible.  Submit refill requests through [facility] or call your pharmacy and they will forward the refill request to us. Please allow 3 business days for your refill to be completed.          Additional Information About Your Visit        MyChart Information     [facility] lets you send messages to your doctor, view your test results, renew your prescriptions, schedule appointments and more. To sign up, go to [email or web address] . Click on \"Log in\" on the left side of the screen, which will take you to the Welcome page. Then click on \"Sign up Now\" on the right side of the page.     You will be asked to enter the access code listed below, as well as some personal information. Please follow the directions to create your username and password.     Your access code is: [email or web address]  Expires: 2018  3:34 PM     Your access code will  in 90 days. If you need help or a new code, please call your [place] clinic or [phone].        Care EveryWhere ID     This is your Care EveryWhere ID. This could be used by other organizations to access your [place] medical records  [id number]        Your Vitals Were     Pulse Temperature Height Breastfeeding? BMI (Body Mass Index)       77 97.3  F (36.3  C) (Oral) 5' 2\" (1.575 m) No 52.31 kg/m2        Blood Pressure from Last 3 Encounters:   11/15/17 145/83   10/13/17 156/84   11 129/86    Weight from Last 3 Encounters:   11/15/17 286 lb (129.7 kg)   10/13/17 283 lb (128.4 kg)   11 236 lb (107 kg)              We " Performed the Following     OPHTHALMOLOGY ADULT REFERRAL          Today's Medication Changes          These changes are accurate as of: 11/15/17  3:34 PM.  If you have any questions, ask your nurse or doctor.               Start taking these medicines.        Dose/Directions    carvedilol 12.5 MG tablet   Commonly known as:  COREG   Used for:  Hypertension, goal below 140/90   Started by:  Tracie Antonio MD        Dose:  12.5 mg   Take 1 tablet (12.5 mg) by mouth 2 times daily (with meals)   Quantity:  180 tablet   Refills:  0       * order for DME   Used for:  Hypertension, goal below 140/90   Started by:  Tracie Antonio MD        Equipment being ordered: BP machine.   Quantity:  1 Device   Refills:  0       * order for DME   Used for:  Left wrist pain   Started by:  Tracie Antonio MD        Equipment being ordered:  Left wrist brace.   Quantity:  1 Device   Refills:  0       * Notice:  This list has 2 medication(s) that are the same as other medications prescribed for you. Read the directions carefully, and ask your doctor or other care provider to review them with you.      These medicines have changed or have updated prescriptions.        Dose/Directions    * nitroGLYcerin 0.4 MG sublingual tablet   Commonly known as:  NITROSTAT   This may have changed:  Another medication with the same name was added. Make sure you understand how and when to take each.   Used for:  Chest pain, unspecified type   Changed by:  Marie Webb APRN CNP        Dose:  0.4 mg   Place 1 tablet (0.4 mg) under the tongue every 5 minutes as needed   Quantity:  25 tablet   Refills:  0       * nitroGLYcerin 0.4 MG sublingual tablet   Commonly known as:  NITROSTAT   This may have changed:  You were already taking a medication with the same name, and this prescription was added. Make sure you understand how and when to take each.   Used for:  Coronary artery disease involving native heart without angina  pectoris, unspecified vessel or lesion type   Changed by:  Tracie Antonio MD        For chest pain place 1 tablet under the tongue every 5 minutes for 3 doses. If symptoms persist 5 minutes after 1st dose call 911.   Quantity:  25 tablet   Refills:  0       * Notice:  This list has 2 medication(s) that are the same as other medications prescribed for you. Read the directions carefully, and ask your doctor or other care provider to review them with you.      Stop taking these medicines if you haven't already. Please contact your care team if you have questions.     atenolol 50 MG tablet   Commonly known as:  TENORMIN   Stopped by:  Tracie Antonio MD                Where to get your medicines      These medications were sent to [facility] [address]  [address] [facility] [address]     Phone:  [phone]     carvedilol 12.5 MG tablet    hydrochlorothiazide 12.5 MG Tabs tablet    nitroGLYcerin 0.4 MG sublingual tablet    pravastatin 40 MG tablet         Some of these will need a paper prescription and others can be bought over the counter.  Ask your nurse if you have questions.     Bring a paper prescription for each of these medications     order for DME    order for DME                Primary Care Provider Office Phone # Fax #    Marie ABI Alves [facility] [phone]       [address] [facility] [address]        Equal Access to Services     SHAKIR [facility] CenterSURAJ AH: Hadii hugo kenny hadasho Soomaali, waaxda luqadaha, qaybta kaalmada adeegyada, cheyenne camilo hayjodi webster . So [facility] [phone].    ATENCIÓN: Si habla español, tiene a carlisle disposición servicios gratuitos de asistencia lingüística. Llame al [phone].    We comply with applicable federal civil rights laws and [place] laws. We do not discriminate on the basis of race, color, national origin, age, disability, sex, sexual  orientation, or gender identity.            Thank you!     Thank you for choosing [facility]  for your care. Our goal is always to provide you with excellent care. Hearing back from our patients is one way we can continue to improve our services. Please take a few minutes to complete the written survey that you may receive in the mail after your visit with us. Thank you!             Your Updated Medication List - Protect others around you: Learn how to safely use, store and throw away your medicines at www.disposemymeds.org.          This list is accurate as of: 11/15/17  3:34 PM.  Always use your most recent med list.                   Brand Name Dispense Instructions for use Diagnosis    aspirin  MG EC tablet     90 tablet    Take 1 tablet (325 mg) by mouth daily    Type 2 diabetes mellitus without complication, without long-term current use of insulin (H)       carvedilol 12.5 MG tablet    COREG    180 tablet    Take 1 tablet (12.5 mg) by mouth 2 times daily (with meals)    Hypertension, goal below 140/90       cetirizine 10 MG tablet    zyrTEC    90 tablet    Take 1 tablet by mouth every evening.    Cough       cholecalciferol 400 UNIT Tabs tablet    vitamin D3     Take 400 Units by mouth daily.        fish oil-omega-3 fatty acids 1000 MG capsule      Take 2 g by mouth daily.        hydrochlorothiazide 12.5 MG Tabs tablet     90 tablet    Take 1 tablet (12.5 mg) by mouth daily    Hypertension, goal below 140/90       * nitroGLYcerin 0.4 MG sublingual tablet    NITROSTAT    25 tablet    Place 1 tablet (0.4 mg) under the tongue every 5 minutes as needed    Chest pain, unspecified type       * nitroGLYcerin 0.4 MG sublingual tablet    NITROSTAT    25 tablet    For chest pain place 1 tablet under the tongue every 5 minutes for 3 doses. If symptoms persist 5 minutes after 1st dose call 911.    Coronary artery disease involving native heart without angina pectoris, unspecified vessel or  lesion type       * order for DME     1 Device    Equipment being ordered: BP machine.    Hypertension, goal below 140/90       * order for DME     1 Device    Equipment being ordered:  Left wrist brace.    Left wrist pain       pravastatin 40 MG tablet    PRAVACHOL    90 tablet    Take 1 tablet (40 mg) by mouth daily    Hyperlipidemia LDL goal <100, Type 2 diabetes mellitus without complication, without long-term current use of insulin (H)       * Notice:  This list has 4 medication(s) that are the same as other medications prescribed for you. Read the directions carefully, and ask your doctor or other care provider to review them with you.

## 2017-11-15 NOTE — NURSING NOTE
"Chief Complaint   Patient presents with     Chronic Disease Management     Musculoskeletal Problem     left wrist pain, possible arthritis        Initial /81  Pulse 77  Temp 97.3  F (36.3  C) (Oral)  Ht 5' 2\" (1.575 m)  Wt 286 lb (129.7 kg)  Breastfeeding? No  BMI 52.31 kg/m2 Estimated body mass index is 52.31 kg/(m^2) as calculated from the following:    Height as of this encounter: 5' 2\" (1.575 m).    Weight as of this encounter: 286 lb (129.7 kg).  Medication Reconciliation: complete    "

## 2017-11-15 NOTE — PROGRESS NOTES
"  SUBJECTIVE:   Deanna Butler is a 67 year old female who presents to clinic today for the following health issues:      Diabetes Follow-up  Patient is checking blood sugars: twice daily.    Blood sugar testing frequency justification:   Results are as follows:       136    Diabetic concerns: None     Symptoms of hypoglycemia (low blood sugar): none     Paresthesias (numbness or burning in feet) or sores: No     Date of last diabetic eye exam: last spring    Hyperlipidemia Follow-Up    Rate your low fat/cholesterol diet?: fair    Taking statin?  Yes, no muscle aches from statin    Other lipid medications/supplements?:  none    Hypertension Follow-up    Outpatient blood pressures are not being checked.  Low Salt Diet: no added salt    Amount of exercise or physical activity: 6-7 days/week for an average of 30-45 minutes    Problems taking medications regularly: Yes,  Availability     Medication side effects: none    Diet: regular (no restrictions)    She was seen by Marie Webb for all of the above on 10/13/2017. Had labs done. Added HCTZ 12.5 mg to atenolol.   She ran out her HCTZ few days ago. It was helping with her leg swelling. Denies SE from HCTZ.   She took atenolol 50 mg this morning. \" I have been taking atenolol for very long time, I don;t think it works.\"   \" I don't want to take many medications.\" she has heard about garlic.     She is here to get refills on her HCTZ.   She is here to see if she can take something else than atenolol for her BP as she has been on it for long time and has heard about atenolol shortage in the pharmacy.   She is here to get all her prescriptions refilled for 3 months.     She tries to walk 3-4 times per day, trying to loose some weight, trying to eat healthy diet.     She does not want to come for f/u on her BP until February end. Has trouble getting to the clinic, has to request her sister to  for her.     * left wrist pain, possible arthritis:  For 4 months. " When she has something with sugar in it that makes her pain worse.   No swelling around the wrist joint. Never had x ray done.   She takes ibuprofen for flare ups. That helps. Tylenol helps as well.     Problem list and histories reviewed & adjusted, as indicated.  Additional history: as documented    Patient Active Problem List   Diagnosis     CAD (coronary artery disease)     Hyperlipidemia LDL goal <100     CKD (chronic kidney disease) stage 3, GFR 30-59 ml/min     Hypertension goal BP (blood pressure) < 140/90     Type 2 diabetes, HbA1C goal < 8% (H)     Venous stasis dermatitis of both lower extremities     History reviewed. No pertinent surgical history.    Social History   Substance Use Topics     Smoking status: Former Smoker     Quit date: 9/16/1980     Smokeless tobacco: Never Used     Alcohol use No      Comment: occasional wine     Family History   Problem Relation Age of Onset     Hypertension Mother      Arthritis Mother      CANCER Father      CANCER Brother      HEART DISEASE Sister          Current Outpatient Prescriptions   Medication Sig Dispense Refill     aspirin  MG EC tablet Take 1 tablet (325 mg) by mouth daily 90 tablet 3     hydrochlorothiazide 12.5 MG TABS tablet Take 1 tablet (12.5 mg) by mouth daily 30 tablet 0     atenolol (TENORMIN) 50 MG tablet Take 1 tablet (50 mg) by mouth daily 30 tablet 0     pravastatin (PRAVACHOL) 40 MG tablet Take 1 tablet (40 mg) by mouth daily 30 tablet 0     nitroGLYcerin (NITROSTAT) 0.4 MG sublingual tablet Place 1 tablet (0.4 mg) under the tongue every 5 minutes as needed 25 tablet 0     cetirizine (ZYRTEC) 10 MG tablet Take 1 tablet by mouth every evening. 90 tablet 3     cholecalciferol (VITAMIN D) 400 UNIT TABS Take 400 Units by mouth daily.       fish oil-omega-3 fatty acids (FISH OIL) 1000 MG capsule Take 2 g by mouth daily.       Recent Labs   Lab Test  10/13/17   1055  07/19/12   1031  09/16/10   1150   A1C  6.3*  6.8*  6.5*   LDL  131*  72   "147*   HDL  50  47*  49*   TRIG  227*  179*  183*   ALT   --   43  45   CR  1.06*  0.94  1.06*   GFRESTIMATED  51*  60*  53*   GFRESTBLACK  62  73  64   POTASSIUM  4.1  4.4  4.5   TSH  2.08  1.71  1.49      BP Readings from Last 3 Encounters:   11/15/17 (!) 146/91   10/13/17 156/84   08/03/11 129/86    Wt Readings from Last 3 Encounters:   11/15/17 286 lb (129.7 kg)   10/13/17 283 lb (128.4 kg)   06/16/11 236 lb (107 kg)           Labs reviewed in EPIC    Reviewed and updated as needed this visit by clinical staffTobacco  Allergies  Meds  Med Hx  Surg Hx  Fam Hx  Soc Hx      Reviewed and updated as needed this visit by Provider         ROS:  Constitutional, HEENT, cardiovascular, pulmonary, gi and gu systems are negative, except as otherwise noted.      OBJECTIVE:   /83 (BP Location: Left arm)  Pulse 77  Temp 97.3  F (36.3  C) (Oral)  Ht 5' 2\" (1.575 m)  Wt 286 lb (129.7 kg)  Breastfeeding? No  BMI 52.31 kg/m2  Body mass index is 52.31 kg/(m^2).  GENERAL: healthy, alert and no distress  NECK: no adenopathy, no asymmetry, masses, or scars and thyroid normal to palpation  RESP: lungs clear to auscultation - no rales, rhonchi or wheezes  CV: regular rate and rhythm  ABDOMEN: soft, nontender, no hepatosplenomegaly, no masses and bowel sounds normal  MS: she is wearing tight pants. Has pitting edema.   Left hand/ wrist: no swelling tenderness on the dorsal wrist, base of left thumb.   Left wrist movements with mild pain.   Thumb opposition with mild pain.     ASSESSMENT/PLAN:       ICD-10-CM    1. Hypertension, goal below 140/90 I10 hydrochlorothiazide 12.5 MG TABS tablet     order for DME     carvedilol (COREG) 12.5 MG tablet   2. Coronary artery disease involving native heart without angina pectoris, unspecified vessel or lesion type I25.10 nitroGLYcerin (NITROSTAT) 0.4 MG sublingual tablet   3. Hyperlipidemia LDL goal <100 E78.5 pravastatin (PRAVACHOL) 40 MG tablet   4. Type 2 diabetes mellitus " without complication, without long-term current use of insulin (H) E11.9 pravastatin (PRAVACHOL) 40 MG tablet   5. Screening for diabetic retinopathy Z13.5 OPHTHALMOLOGY ADULT REFERRAL   6. Left wrist pain M25.532 XR Wrist Left G/E 3 Views     order for DME   7. Need for prophylactic vaccination and inoculation against influenza Z23      HTN: atenolol changed to Coreg. Continue with HCTZ. Pt to check BP at home, proper BP measuring technique is discussed with pt. If BP stays higher than 140/90 at home she needs to see provider sooner than 3 months. Otherwise okay to f/ u in 3 months.     Left wrist: pt left w/o getting the wrist x ray done. Wrist brace with thumb, tylenol as needed for pain, limit ibuprofen use.     Tracie Antonio MD  [facility]

## 2017-11-17 ENCOUNTER — TELEPHONE (OUTPATIENT)
Dept: FAMILY MEDICINE | Facility: CLINIC | Age: 67
End: 2017-11-17

## 2017-11-17 DIAGNOSIS — I10 HYPERTENSION, GOAL BELOW 140/90: ICD-10-CM

## 2017-11-17 RX ORDER — CARVEDILOL PHOSPHATE 20 MG/1
20 CAPSULE, EXTENDED RELEASE ORAL DAILY
Qty: 90 CAPSULE | Refills: 0 | Status: SHIPPED | OUTPATIENT
Start: 2017-11-17 | End: 2017-12-21

## 2017-11-17 NOTE — TELEPHONE ENCOUNTER
Reason for Call:  Medication or medication refill:    Do you use a [facility]?  Name of the pharmacy and phone number for the current request:  [facility]    Name of the medication requested: atenolol (TENORMIN) 50 MG tablet     Other request: patient would like to go back to taking the atenolol (TENORMIN) 50 MG tablet because she only has to take one a day/ patient feels like it agrees with her body better and she called the pharmacy and they have a good supply of it. Please call the patient to let her know when this has been sent to the pharmacy/ patient was hoping to get this medication today.    Can we leave a detailed message on this number? YES    Phone number patient can be reached at: Home number on file [phone] (home)    Best Time: any    Call taken on 11/17/2017 at 9:04 AM by Jolene Ortega

## 2017-11-17 NOTE — TELEPHONE ENCOUNTER
Called patient and notified her of message below from provider. Patient understands but she is having a difficult time remembering to take the second dose of Coreg. Is there a way that she can have a Rx for 25 mg daily Coreg. Patient does not eat breakfast so taking the Coreg in the am upsets her stomach. She is currently taking the 2 doses one at 5 am and the second dose at 2 pm.  She states that she is having to make numerous changes that her body is not use to in order for her to take these meds. When she took her meds later in the day, her BP was elevated so she wants to keep taking it in the am. Easier to take just one pill in the am.    Routed to provider to advise.   Adrianna [place] RN  [facility]

## 2017-11-17 NOTE — TELEPHONE ENCOUNTER
It may take few days for body to get adjusted to new medication,I do not suggest the switch back to atenolol ( atenolol shortage).     She can use OTC cream/ lidocaine patches as needed for her arthritis pain.   If pt desires to discuss her arthritis in detail, she needs another office visit, she is new to me, it is hard to discuss all other options on the phone.     Tracie Antonio MD.   Family Physician.  [facility].

## 2017-11-17 NOTE — TELEPHONE ENCOUNTER
Called patient and she stated that she only wants to talk to provider now.    Today she said.    Patricia Ramires RN [facility] Triage.

## 2017-11-17 NOTE — TELEPHONE ENCOUNTER
I have sent prescription for coreg 20 mg extended release that she has to take one pill daily, pt to check with her insurance for coverage.     If extended release is covered then she can stop taking 12.5 mg BID and take only 20 mg once daily.     Tracie Antonio MD.   Family Physician.  [facility].

## 2017-11-17 NOTE — TELEPHONE ENCOUNTER
Called patient. She says she says she won't be able to get the Coreg 20 mg capsules until Monday. For the time being she is only going to take Coreg 12.5 daily and not the second dose. Currently her BP is 118/91-has no symptoms.   Rn spoke with provider LAWRENCE and she says that patient is fine to take 12. 5 mg Coreg daily until she gets the 20 mg caps however, she will need to take the second dose if her BP gets over 140/90. Patient stated understanding.       Adrianna [place] RN  [facility]

## 2017-11-17 NOTE — TELEPHONE ENCOUNTER
Called patient. She states that Atenolol would work out better for her as she only needs to take it once a day instead of the Coreg. With the Coreg, she takes it twice a day. The dose she takes in the morning makes her stomach upset as she does not always eat breakfast. She has started taking BP readings. Her last BP reading was 140/something. She does not have any headaches, blurred vision, nosebleeds.  Patient is also taking Tylenol for arthritis. She takes one sometimes 2. When she takes 2 it makes her dizzy. Is there something that you could recommend for her to take for the arthritis?      Routed to provider to advise.  Adrianna [place] RN  [facility]      She states Dr. Antonio will be her primary care doctor.

## 2017-11-17 NOTE — TELEPHONE ENCOUNTER
Reason for Call:  Other prescription    Detailed comments: Deanna called in. She has some questions about the new Rx. Also her BP is 118/91.     Phone Number Patient can be reached at: Home number on file [phone] (home)    Best Time:     Can we leave a detailed message on this number? YES    Call taken on 11/17/2017 at 3:31 PM by Shayla Hood

## 2017-11-19 ENCOUNTER — TELEPHONE (OUTPATIENT)
Dept: NURSING | Facility: CLINIC | Age: 67
End: 2017-11-19

## 2017-11-19 NOTE — TELEPHONE ENCOUNTER
"Clinic Action Needed:Yes  Reason for Call: Pt wants to let her provider know the new BP med, carvedilol, is making her sick to her stomach and anxious.  She has not gotten the 24 hr carvedilol yet.  She has stopped taking it and has gone back to the Atenolol 50 mg daily. She would like to stay with the atenolol, since it doesn't make her sick and her BP \"stays down.\" Today 132/73. Please call patient to let her know what her treatment recommendation is.   Patient Recommendations/Teaching:Advised to stay with what her provider recommended and eat food with the carvedilol. She says she has had the \"flu\" and not eating much now. States, \"I can't eat enough to keep it from bothering me.\" Will only take atenolol now. Refused RN triage at this time.   Routed to: Dr Antonio care team    Cristy Louis RN, [facility]      "

## 2017-11-20 NOTE — TELEPHONE ENCOUNTER
Deanna Butler is a 67 year old female who calls with diarrhea. Started Atenolol 50 mg started yesterday because can take without stomach upset (can take on empty stomach), did purchase a BP machine 160/?  after Atenolol 137/? Patient going tor Ensure order Please send script to pharmacy - patient states has had diarrhea Friday once, Saturday once, Sunday once and once today - describes as watery, ate cereal with milk yesterday and vomited it back up.    wants 90 of Atenolol ordered and Ensure also   pharmacy cued    NURSING ASSESSMENT:  ADMITS: New prescription Coreg carvedilol.   DENIES: diarrhea and severe weakness, lethargy or faintness, severe abdominal pain, swelling, and fever, diabetic and rapid or labored breathing, severe abdominal pain, grossly bloody stool, sings of dehydration, dizziness upon standing, persistent vomiting and diarrhea, diarrhea every 30 to 60 minutes for > 6 hours, diarrhea for > 5 days, mucous, pus, yellow, green or frothy stool, fever, loss of bowel control, no improvement with home care measures, recent change in diet, other family members have diarrhea, recent [place] a [place] recent antibiotics in past 2 months,   Description:  Diarrhea, nausea, dry heaves/ no vomiting -   Onset/duration:  Started Friday   Precip. factors:  Cannot eat  Associated symptoms:  Unable to eat,   Improves/worsens symptoms:  Coreg makes nauseated.   Pain scale (0-10)   0/10  LMP/preg/breast feeding:  na  Last exam/Treatment:  11/15/2017  Allergies:   Allergies   Allergen Reactions     Lisinopril      Possible rash     Metformin      Possible rash       MEDICATIONS:   Taking medication(s) as prescribed? Yes  Taking over the counter medication(s?) Yes possible   Any medication side effects? Possible side affects of Coreg.    Any barriers to taking medication(s) as prescribed?  No  Medication(s) improving/managing symptoms?  No  Medication reconciliation completed: N/A      NURSING PLAN:  Nursing advice to patient clear liquid diet first 12 to 24 hours - progress to eating soup avoiding cream soups, dry toast, soda crackers, white rice, pretzels, bananas, applesauce and potatoes, progress to a regular diet after soft-fromed stools occur., avoid dairy products, citrus juices, raw fruits and vegetables, fried or spicy foods for 2 to 5 days after diarrhea subsides,     RECOMMENDED DISPOSITION:  Home care advice - see above3  Will comply with recommendation: Yes  If further questions/concerns or if symptoms do not improve, worsen or new symptoms develop, call your PCP or [facility] as soon as possible.      Guideline used: Diarrhea, adult page 186 to 187  Telephone Triage Protocols for Nurses, Fifth Edition, Paulina Morillo RN

## 2017-11-20 NOTE — TELEPHONE ENCOUNTER
Reason for call:  Patient reporting a symptom    Symptom or request: Diarrhea, kind of an achy feeling, no appetite    Duration (how long have symptoms been present): Friday afternoon    Have you been treated for this before? No    Additional comments: patient also states she is upset and wants to start taking her Atenolol, patient states she stopped taking her Carvedilol    Phone Number patient can be reached at:  Home number on file [phone] (home)    Best Time:  any    Can we leave a detailed message on this number:  YES    Call taken on 11/20/2017 at 7:52 AM by Adilene Farley

## 2017-11-20 NOTE — TELEPHONE ENCOUNTER
Patient returned call to clinic to states having severe left sided abdominal pain crying - advised to go to ED (no open appointments in [facility] clinic) - patient crying and repeating that this isn't right - admits is nauseated. Patient states will call ambulance because does not have a ride to ED.     Margaret Morillo RN  [facility]

## 2017-11-22 ENCOUNTER — TELEPHONE (OUTPATIENT)
Dept: FAMILY MEDICINE | Facility: CLINIC | Age: 67
End: 2017-11-22

## 2017-11-22 NOTE — TELEPHONE ENCOUNTER
Reason for Call:  Other     Detailed comments: patient has a question about getting a pre op please call to discuss     Phone Number Patient can be reached at: Home number on file [phone] (home)    Best Time: any    Can we leave a detailed message on this number? YES    Call taken on 11/22/2017 at 2:19 PM by Dayana Nguyen

## 2017-11-22 NOTE — TELEPHONE ENCOUNTER
Patient called back stated that she will not need a call back after all to disregard the last message she had a pro po at the hospital

## 2017-11-29 ENCOUNTER — TELEPHONE (OUTPATIENT)
Dept: FAMILY MEDICINE | Facility: CLINIC | Age: 67
End: 2017-11-29

## 2017-11-29 NOTE — LETTER
November 29, 2017    Deanna Butler  [address] [place] [address] [address]  [facility] [address]    Dear Deanna    We care about your health and have reviewed your health plan. We have reviewed your medical conditions, medication list, and lab results and are making recommendations based on this review, to better manage your health.    You are in particular need of attention regarding:  - Scheduling a Breast Cancer Screening (Mammography) [phone]  - Completing a Colon Cancer Screening (FIT) - Please complete FIT test which can be picked up at the clinic and mail completed test to clinic.      Here is a list of Health Maintenance topics that are due now or due soon:  Health Maintenance Due   Topic Date Due     EYE EXAM Q1 YEAR  [birth year]08/1951     FIT Q1 YR  03/08/1959     TETANUS IMMUNIZATION (SYSTEM ASSIGNED)  03/08/1967     MAMMO SCREEN Q2 YR (SYSTEM ASSIGNED)  03/08/1999     ADVANCE DIRECTIVE PLANNING Q5 YRS  03/08/2004     MICROALBUMIN Q1 YEAR  07/19/2013     DEXA SCAN SCREENING (SYSTEM ASSIGNED)  03/08/2014     PNEUMOCOCCAL (1 of 2 - PCV13) 03/08/2014     INFLUENZA VACCINE (SYSTEM ASSIGNED)  09/01/2017     We will be calling you in the next couple of weeks to help you schedule any appointments that are needed.  Please call us at [phone] (or use [employer]) to address the above recommendations.     Thank you for trusting [facility] and we appreciate the opportunity to serve you.  We look forward to supporting your healthcare needs in the future.    Healthy Regards,    Your Care Team

## 2017-11-29 NOTE — TELEPHONE ENCOUNTER
Panel Management Review      Patient has the following on her problem list:     Hypertension   Last three blood pressure readings:  BP Readings from Last 3 Encounters:   11/15/17 145/83   10/13/17 156/84   08/03/11 129/86     Blood pressure: Passed    HTN Guidelines:  Age 18-59 BP range:  Less than 140/90  Age 60-85 with Diabetes:  Less than 140/90  Age 60-85 without Diabetes:  less than 150/90        Composite cancer screening  Chart review shows that this patient is due/due soon for the following Mammogram and Fecal Colorectal (FIT)  Summary:    Patient is due/failing the following:   FIT and MAMMOGRAM    Action needed:   Patient needs referral/order: Mammo and FIT    Type of outreach:    Sent letter.    Questions for provider review:    None                                                                                                                                    Faby Avalos MA       Chart routed to Care Team .

## 2017-11-30 ENCOUNTER — TELEPHONE (OUTPATIENT)
Dept: FAMILY MEDICINE | Facility: CLINIC | Age: 67
End: 2017-11-30

## 2017-11-30 NOTE — TELEPHONE ENCOUNTER
Called patient who says she does not want to take her Pravastatin anymore as she does not want to take too many medications. Says her levels are borderline and she would like to modify her eating habits, activity to improve this.  Routed to provider as LAUREN Casey RN  [facility]

## 2017-11-30 NOTE — TELEPHONE ENCOUNTER
Reason for Call:  Prescription question    Detailed comments: Patient would like to discontinue her cholesterol Rx and just watch her eating  Please call to discuss.    Phone Number Patient can be reached at:   Deanna Butler (Self) [phone] (K)         Best Time: anytime    Can we leave a detailed message on this number? YES    Call taken on 11/30/2017 at 3:36 PM by Evelyn Nayak

## 2017-12-01 NOTE — TELEPHONE ENCOUNTER
Called and spoke with patient, advised of message as below.  She tangets off on her diet.  She agrees with plan of care and understands.    When she was in the hospital, they tested her stool and everything was fine.  She had a physical in hospital and everything was good.  She doesn't want to do her Mammogram, states she doesn't need her FIT test.  RN advised to have hospital send us those records.  Also her toe nails are back to normal.      Gregoria Pace RN  [facility]

## 2017-12-01 NOTE — TELEPHONE ENCOUNTER
Patient calling to ask what PCP has to say about this.  Patient is very nervous.    Also wanting to let PCP know that her BP is 133/70 today.

## 2017-12-01 NOTE — TELEPHONE ENCOUNTER
BP looks good.   She needs to be on Pravastatin due to her CAD.   She is new to me, for any further questions pt needs office visit.     Tracie Antonio MD.   Family Physician.  [facility].

## 2017-12-04 ENCOUNTER — TELEPHONE (OUTPATIENT)
Dept: FAMILY MEDICINE | Facility: CLINIC | Age: 67
End: 2017-12-04

## 2017-12-04 NOTE — TELEPHONE ENCOUNTER
Reason for call:  Patient reporting a symptom    Symptom or request: dizziness from water pills    Duration (how long have symptoms been present): today    Have you been treated for this before? Yes    Additional comments: patient was prescribed water pills to take with her BP meds.  Patient is asking if she can stop taking the water pill and take the BP med only.  Please call to advise.    Phone Number patient can be reached at:  Home number on file [phone] (home)    Best Time:  any    Can we leave a detailed message on this number:  YES    Call taken on 12/4/2017 at 11:10 AM by Francie Lester

## 2017-12-04 NOTE — TELEPHONE ENCOUNTER
Patient calling back stating that she does not think it's the medication that caused her dizziness. Patient states that she hasn't been eating as much. Patient states that she ate something and isn't feeling anymore dizziness. Patient states that she will call back in a couple days if she's still having problems. Patient states that she does not need a call back from provider at this time.

## 2017-12-04 NOTE — TELEPHONE ENCOUNTER
Deanna Butler is a 67 year old female who calls with dizziness for one day.  Patient stated that she woke up slightly dizzy today, and looked at the side effects of HCTZ and thinks this is the reason.  It happened after she took the med, but is a little better now.  Her BP was 143/79 this am and her blood sugar was 133.    PATIENT DENIES: sudden onset of weakness or numbness in face,arms, legs on one side of body, difficulty speaking or walking, confusion, facial droop, fainting spells or loss of consciousness, heart rate < 50 or > 130, irregular heart rate, persistent headache or change in vision, fever, pale skin, nausea, vomiting, diarrhea, dizziness gets worse when she stands up, earache, ringing in her ears.    NURSING ASSESSMENT:  Description:  See above  Onset/duration:   One day  Precip. factors:  none  Associated symptoms:  none  Improves/worsens symptoms:  nothing  Pain scale (0-10)   0/10  Last exam/Treatment:  11/15/17  Allergies:   Allergies   Allergen Reactions     Lisinopril      Possible rash     Metformin      Possible rash       MEDICATIONS:   Taking medication(s) as prescribed? Yes  Taking over the counter medication(s?) No  Any medication side effects? See allergies  Any barriers to taking medication(s) as prescribed?  No  Medication(s) improving/managing symptoms?  N/A    NURSING PLAN: Routed to provider Yes.  PCP is out of the clinic until 12/11/17. Patient asked if Marie Webb can please call her back herself.            RECOMMENDED DISPOSITION:  Patient requests that Marie Webb call her back  Will comply with recommendation: yes  If further questions/concerns or if symptoms do not improve, worsen or new symptoms develop, call your PCP or [facility] as soon as possible.      Guideline used: Dizziness  Telephone Triage Protocols for Nurses, Fifth Edition, Paulina Ramires RN

## 2017-12-11 ENCOUNTER — TELEPHONE (OUTPATIENT)
Dept: FAMILY MEDICINE | Facility: CLINIC | Age: 67
End: 2017-12-11

## 2017-12-11 DIAGNOSIS — I10 HYPERTENSION, GOAL BELOW 140/90: ICD-10-CM

## 2017-12-12 RX ORDER — CARVEDILOL PHOSPHATE 20 MG/1
CAPSULE, EXTENDED RELEASE ORAL
Qty: 90 CAPSULE | Refills: 0 | OUTPATIENT
Start: 2017-12-12

## 2017-12-12 NOTE — TELEPHONE ENCOUNTER
Requested Prescriptions   Pending Prescriptions Disp Refills     carvedilol (COREG CR) 20 MG 24 hr capsule [Pharmacy Med Name: CARVEDILOL PHOSPHATE ER 20MG CP24] 90 capsule 0     Sig: TAKE ONE CAPSULE (20 MG) BY MOUTH ONCE DAILY    Beta-Blockers Protocol Failed    12/11/2017 10:04 AM       Failed - Blood pressure under 140/90    BP Readings from Last 3 Encounters:   11/15/17 145/83   10/13/17 156/84   08/03/11 129/86                Passed - Patient is age 6 or older       Passed - Recent or future visit with authorizing provider's specialty    Patient had office visit in the last year or has a visit in the next 30 days with authorizing provider.  See chart review.

## 2017-12-12 NOTE — TELEPHONE ENCOUNTER
Called and spoke with pharmacy.  Patient picked up 90 day supply 11/21/17.  They think she's trying to be proactive, however, patient needs to be seen in 3 months.  Pharmacy will inform her.    Gregoria Pace RN  [facility]

## 2017-12-12 NOTE — TELEPHONE ENCOUNTER
Patient called and stated that she just wanted to let nurse know that prescription is working well for her blood pressure and she took her blood pressure this morning and it was 142/69 and she said that was a good reading for her so she is going to continue with the medication even though its a little high in price.      Thank you!  Gretchen FRANKLIN  Patient Representative  [facility]

## 2017-12-14 NOTE — TELEPHONE ENCOUNTER
Patient is calling to say that this medication is working great. She checked her bp today and it was 121/72 which she stated was an excellent reading. She also added that this medication doesn't give her any headaches or puffiness. She would like her refill sent to the pharmacy, a 90 day supply, so that she can get it when she needs it. She would like to use the [facility] in our clinic. Feel free to call with any questions [phone].    .Gus Benedict  Patient Representative

## 2017-12-18 DIAGNOSIS — I10 HYPERTENSION, GOAL BELOW 140/90: ICD-10-CM

## 2017-12-18 RX ORDER — CARVEDILOL 12.5 MG/1
12.5 TABLET ORAL 2 TIMES DAILY WITH MEALS
Qty: 180 TABLET | Refills: 0 | Status: CANCELLED | OUTPATIENT
Start: 2017-12-18

## 2017-12-18 NOTE — TELEPHONE ENCOUNTER
Reason for Call:  Medication or medication refill:    Do you use a [facility]?  Name of the pharmacy and phone number for the current request:  [facility]     Name of the medication requested: carvedilol (COREG CR) 20 MG 24 hr capsule    Other request: patient calling ahead for a refill.  Is OK for now, but does not want to run out before her follow up.  Patient is scheduled with PCP on 2-21-18 for a BP follow up.  Please inform patient when done.    Can we leave a detailed message on this number? YES    Phone number patient can be reached at: Cell number on file:    Telephone Information:   Mobile [phone]     Best Time: any    Call taken on 12/18/2017 at 11:12 AM by Francie Lester    carvedilol (COREG CR) 20 MG 24 hr capsule      Last Written Prescription Date: 11-17-17  Last Fill Quantity: 90, # refills: 0    Last Office Visit with [facility] or [facility] prescribing provider:  11-15-17   Future Office Visit:    Next 5 appointments (look out 90 days)     Feb 21, 2018 10:20 AM CST   SHORT with Tracie Antonio MD   [facility]    [address] [place] [id number] [phone]                    BP Readings from Last 3 Encounters:   11/15/17 145/83   10/13/17 156/84   08/03/11 129/86

## 2017-12-21 ENCOUNTER — TELEPHONE (OUTPATIENT)
Dept: FAMILY MEDICINE | Facility: CLINIC | Age: 67
End: 2017-12-21

## 2017-12-21 NOTE — TELEPHONE ENCOUNTER
Requested Prescriptions   Pending Prescriptions Disp Refills     carvedilol (COREG) 12.5 MG tablet 180 tablet 0     Sig: Take 1 tablet (12.5 mg) by mouth 2 times daily (with meals)    Beta-Blockers Protocol Failed    12/21/2017  2:35 PM       Failed - Blood pressure under 140/90    BP Readings from Last 3 Encounters:   11/15/17 145/83   10/13/17 156/84   08/03/11 129/86                Passed - Patient is age 6 or older       Passed - Recent or future visit with authorizing provider's specialty    Patient had office visit in the last year or has a visit in the next 30 days with authorizing provider.  See chart review.               Last filled 11/17/2017  Last OV 11/15/2017    Routing refill request to provider for review/approval because:  Failed protocols        Margaret Morillo RN  [facility]

## 2017-12-21 NOTE — TELEPHONE ENCOUNTER
All I see is the coreg update (patient wanted more refills sent to [facility] pharmacy), 90 day supply was sent in November so no new Rx needed yet.  This was coreg 20 mg CR form.      However, I see refill request for coreg 12.5 mg BID was requested by [facility] pharmacy on 12/18/17 and routed to Dr. Antonio today.    I called [facility] pharmacy and spoke to pharmacist who states the 20 mg ER and the 12.5 mg BID coregs were dispensed as dated in [facility], new meds to patient.  She says patient was being proactive in requesting refill of coreg now, she will not be out until February.    Pharmacist says call to Deanna may have been pharmacy to let her know refill was denied at this time.    I called patient.   She is not on 12.5 mg BID, is on 20 mg daily.   Wants Rx sent so she has refill available before she is seen 2/21/17.    I rerouted the refill request to Dr. Antonio and updated [facility] med list.    Betty Duran RN  [facility]

## 2017-12-21 NOTE — TELEPHONE ENCOUNTER
I was calling patient back responding to her call back to us.    I did clarify that she is NOT on the 12.5 mg coreg BID but is just on the 20 mg CR daily.  She says her BP is doing great and she is scheduled to see Dr. Antonio 2/21/17.    She knows it is not time to refill but wants to have another 90 day supply on profile as she WILL run out before seen by Dr. Antonio in Feb.    I re-routed to Dr. Antonio to address sending profile refill now.  Betty Duran RN  [facility]

## 2017-12-21 NOTE — TELEPHONE ENCOUNTER
Reason for Call:  Other call back    Detailed comments: Patient states she had missed calls from the clinic today at 2:37 and 3:37. No calls to patient were recorded in chart.    Phone Number Patient can be reached at: Cell number on file:    Telephone Information:   Mobile [phone]       Best Time: anytime    Can we leave a detailed message on this number? YES    Call taken on 12/21/2017 at 3:53 PM by Gus Benedict

## 2017-12-22 RX ORDER — CARVEDILOL PHOSPHATE 20 MG/1
20 CAPSULE, EXTENDED RELEASE ORAL DAILY
Qty: 90 CAPSULE | Refills: 0 | Status: SHIPPED | OUTPATIENT
Start: 2017-12-22 | End: 2018-04-23

## 2018-03-12 ENCOUNTER — TELEPHONE (OUTPATIENT)
Dept: FAMILY MEDICINE | Facility: CLINIC | Age: 68
End: 2018-03-12

## 2018-03-12 DIAGNOSIS — E11.9 TYPE 2 DIABETES MELLITUS WITHOUT COMPLICATION, WITHOUT LONG-TERM CURRENT USE OF INSULIN (H): ICD-10-CM

## 2018-03-12 NOTE — TELEPHONE ENCOUNTER
Called [phone] (home). The message stated that the [employer] consumer that you are calling is not available at this time.  Please try your call again later.  Patricia Ramires RN [facility] Triage.

## 2018-03-12 NOTE — TELEPHONE ENCOUNTER
Reason for Call:  Other Misc.    Detailed comments: Patient wants to know 2 things:  1) would it be safe for her to take 1 kayla aspirin per day, 81 mg     Phone Number Patient can be reached at: Home number on file [phone] (home)    Best Time: anytime    Can we leave a detailed message on this number? YES    Call taken on 3/12/2018 at 1:44 PM by Lili Vale

## 2018-03-13 NOTE — TELEPHONE ENCOUNTER
Called and talked to patient.  She stated that she went off her asa 81 mg when she had her kidney stones and she was not feeling good.  She does have a history of a heart attack per patient.    She is wondering if she should go back on it.  Medication T'd up and needs to be sent to pharmacy if she is to go back on it.    Patricia Ramires RN [facility] Triage.

## 2018-03-14 NOTE — TELEPHONE ENCOUNTER
Attempt # 1  Called patient at home number.[phone]  Was call answered?  No, left message to call clinic to schedule an appointment - gave main number and the nurse line.    Margaret Morillo RN  [facility]

## 2018-03-14 NOTE — TELEPHONE ENCOUNTER
Pt to schedule office visit for BP follow up, I will discuss about all her medications during that visit.   Aspirin 325 mg approved, she was on this dose.     Tracie Antonio MD.   Family Physician.  [facility].

## 2018-03-14 NOTE — TELEPHONE ENCOUNTER
Reason for Call:  Other call back    Detailed comments: patient called wondering if she could save on her copay if she just monitored her BP and reported to her Dr on a weekly process.. She states she is taking her medication and she says her BP is doing really well    Phone Number Patient can be reached at: Home number on file [phone] (home)    Best Time: anytime    Can we leave a detailed message on this number? YES    Call taken on 3/14/2018 at 6:50 PM by Mariluz Hodges

## 2018-03-15 NOTE — TELEPHONE ENCOUNTER
Attempt # 1  Called patient at home number.  Was call answered?  Yes, patient insurance okay appointment in May - has figured out medications and has enough medications to last until appointment May 9. Patient requests nurse not send information to Marisela - send only to [facility].   Patient wondering if okay to just monitor BP - nurse verified patient understands to call clinic with high BP - states will call if BP get up to 180/100. States if has symptoms will call 911.     Routing to Dr. Engelmann - patient was a patient of Dr. Antonio so is transferring her care to Dr. Engelmann. Would like it if Dr. Engelmann would renew her meds in May without having to see her until the fall?    Margaret Morillo RN  [facility]     /77

## 2018-03-15 NOTE — TELEPHONE ENCOUNTER
No, patient needs to come in May to establish. Dr. Antonio wanted to see her every 3 months and I think follow up every 3-6 months with her medical issues is most appropriate. Thanks.    Lauren Engelmann, MD

## 2018-03-15 NOTE — TELEPHONE ENCOUNTER
Called patient at [phone] (home). Left message on voicemail to return phone call to triage.  Patricia Ramires RN [facility] Triage.

## 2018-03-15 NOTE — TELEPHONE ENCOUNTER
Patient returned call - patient requesting a business card be mailed to her home address. Patient states does not want X-ray of hands at appointment in May - states with warmer weather not bothering as much.    Margaret Morillo RN  [facility]

## 2018-04-23 DIAGNOSIS — E78.5 HYPERLIPIDEMIA LDL GOAL <100: ICD-10-CM

## 2018-04-23 DIAGNOSIS — E11.9 TYPE 2 DIABETES MELLITUS WITHOUT COMPLICATION, WITHOUT LONG-TERM CURRENT USE OF INSULIN (H): ICD-10-CM

## 2018-04-23 DIAGNOSIS — I10 HYPERTENSION, GOAL BELOW 140/90: ICD-10-CM

## 2018-04-23 NOTE — TELEPHONE ENCOUNTER
Does not have enough to get her through to her next appt, she asked if you could send at least a months worth.  Thanks!  Marci Ortiz, [facility]  [phone]

## 2018-04-23 NOTE — TELEPHONE ENCOUNTER
"Requested Prescriptions   Pending Prescriptions Disp Refills     hydrochlorothiazide 12.5 MG TABS tablet [Pharmacy Med Name: HYDROCHLOROTHIAZIDE 12.5MG TABS] 90 tablet 1    Last Written Prescription Date:  11-15-17  Last Fill Quantity: 90,  # refills: 1   Last office visit: 11/15/2017 with prescribing provider:     Future Office Visit:   Next 5 appointments (look out 90 days)     May 09, 2018 10:00 AM CDT   [facility]eliese [facility]   [facility]    [address] [place] [id number] [phone]                  Sig: TAKE ONE TABLET BY MOUTH EVERY DAY    Diuretics (Including Combos) Protocol Failed    4/23/2018  9:05 AM       Failed - Blood pressure under 140/90 in past 12 months    BP Readings from Last 3 Encounters:   11/15/17 145/83   10/13/17 156/84   08/03/11 129/86                Failed - Normal serum creatinine on file in past 12 months    Recent Labs   Lab Test  10/13/17   1055   CR  1.06*             Passed - Recent (12 mo) or future (30 days) visit within the authorizing provider's specialty    Patient had office visit in the last 12 months or has a visit in the next 30 days with authorizing provider or within the authorizing provider's specialty.  See \"Patient Info\" tab in inbasket, or \"Choose Columns\" in Meds & Orders section of the refill encounter.           Passed - Patient is age 18 or older       Passed - No active pregancy on record       Passed - Normal serum potassium on file in past 12 months    Recent Labs   Lab Test  10/13/17   1055   POTASSIUM  4.1                   Passed - Normal serum sodium on file in past 12 months    Recent Labs   Lab Test  10/13/17   1055   NA  138             Passed - No positive pregnancy test in past 12 months        carvedilol (COREG CR) 20 MG 24 hr capsule [Pharmacy Med Name: CARVEDILOL PHOSPHATE ER 20MG CP24] 90 capsule 0    Last Written Prescription Date:  " "12-22-17  Last Fill Quantity: 90,  # refills: 0   Last office visit: 11/15/2017 with prescribing provider:     Future Office Visit:   Next 5 appointments (look out 90 days)     May 09, 2018 10:00 AM CDT   SHORT with Lauren Anneliese [facility]mann, [facility]   [facility]    [address] [facility] [address]   [phone]                  Sig: TAKE ONE CAPSULE BY MOUTH ONCE DAILY    Beta-Blockers Protocol Failed    4/23/2018  9:05 AM       Failed - Blood pressure under 140/90 in past 12 months    BP Readings from Last 3 Encounters:   11/15/17 145/83   10/13/17 156/84   08/03/11 129/86                Passed - Patient is age 6 or older       Passed - Recent (12 mo) or future (30 days) visit within the authorizing provider's specialty    Patient had office visit in the last 12 months or has a visit in the next 30 days with authorizing provider or within the authorizing provider's specialty.  See \"Patient Info\" tab in inbasket, or \"Choose Columns\" in Meds & Orders section of the refill encounter.            pravastatin (PRAVACHOL) 40 MG tablet [Pharmacy Med Name: PRAVASTATIN SODIUM 40MG TABS] 90 tablet 1    Last Written Prescription Date:  11-15-17  Last Fill Quantity: 90,  # refills: 1   Last office visit: 11/15/2017 with prescribing provider:     Future Office Visit:   Next 5 appointments (look out 90 days)     May 09, 2018 10:00 AM CDT   SHORT with Lauren Anneliese Engelmann, MD   [facility]    [address] [place] [id number]   [phone]                  Sig: TAKE ONE TABLET BY MOUTH EVERY DAY    Statins Protocol Passed    4/23/2018  9:05 AM       Passed - LDL on file in past 12 months    Recent Labs   Lab Test  10/13/17   1055   LDL  131*            Passed - No abnormal creatine kinase in past 12 months    Recent Labs   Lab Test  07/19/12   1032   CKT  " "75               Passed - Recent (12 mo) or future (30 days) visit within the authorizing provider's specialty    Patient had office visit in the last 12 months or has a visit in the next 30 days with authorizing provider or within the authorizing provider's specialty.  See \"Patient Info\" tab in inbasket, or \"Choose Columns\" in Meds & Orders section of the refill encounter.           Passed - Patient is age 18 or older       Passed - No active pregnancy on record       Passed - No positive pregnancy test in past 12 months          "

## 2018-04-24 NOTE — TELEPHONE ENCOUNTER
Patient is scheduled to be seen by Dr. Engelmann 5/9/18.    Routing refill request to provider for review/approval because:  Labs out of range:  LDL and creatinine, elevated BP      Betty Duran RN  [facility]

## 2018-04-25 RX ORDER — CARVEDILOL PHOSPHATE 20 MG/1
CAPSULE, EXTENDED RELEASE ORAL
Qty: 30 CAPSULE | Refills: 0 | Status: SHIPPED | OUTPATIENT
Start: 2018-04-25 | End: 2018-05-31

## 2018-04-25 RX ORDER — PRAVASTATIN SODIUM 40 MG
TABLET ORAL
Qty: 30 TABLET | Refills: 0 | Status: SHIPPED | OUTPATIENT
Start: 2018-04-25 | End: 2018-05-31

## 2018-04-25 RX ORDER — HYDROCHLOROTHIAZIDE 12.5 MG/1
TABLET ORAL
Qty: 30 TABLET | Refills: 0 | Status: SHIPPED | OUTPATIENT
Start: 2018-04-25 | End: 2018-05-31

## 2018-05-29 DIAGNOSIS — R07.9 CHEST PAIN, UNSPECIFIED TYPE: ICD-10-CM

## 2018-05-31 DIAGNOSIS — R07.9 CHEST PAIN, UNSPECIFIED TYPE: ICD-10-CM

## 2018-05-31 DIAGNOSIS — E78.5 HYPERLIPIDEMIA LDL GOAL <100: ICD-10-CM

## 2018-05-31 DIAGNOSIS — I10 HYPERTENSION, GOAL BELOW 140/90: ICD-10-CM

## 2018-05-31 DIAGNOSIS — E11.9 TYPE 2 DIABETES MELLITUS WITHOUT COMPLICATION, WITHOUT LONG-TERM CURRENT USE OF INSULIN (H): ICD-10-CM

## 2018-06-01 ENCOUNTER — TELEPHONE (OUTPATIENT)
Dept: FAMILY MEDICINE | Facility: CLINIC | Age: 68
End: 2018-06-01

## 2018-06-01 RX ORDER — NITROGLYCERIN 0.4 MG/1
0.4 TABLET SUBLINGUAL EVERY 5 MIN PRN
Qty: 25 TABLET | Refills: 0 | Status: SHIPPED | OUTPATIENT
Start: 2018-06-01 | End: 2018-06-01

## 2018-06-01 RX ORDER — HYDROCHLOROTHIAZIDE 12.5 MG/1
TABLET ORAL
Qty: 30 TABLET | Refills: 0 | Status: SHIPPED | OUTPATIENT
Start: 2018-06-01 | End: 2018-07-03

## 2018-06-01 RX ORDER — CARVEDILOL PHOSPHATE 20 MG/1
CAPSULE, EXTENDED RELEASE ORAL
Qty: 30 CAPSULE | Refills: 0 | Status: SHIPPED | OUTPATIENT
Start: 2018-06-01 | End: 2018-07-03

## 2018-06-01 RX ORDER — PRAVASTATIN SODIUM 40 MG
TABLET ORAL
Qty: 30 TABLET | Refills: 0 | Status: SHIPPED | OUTPATIENT
Start: 2018-06-01 | End: 2018-08-29

## 2018-06-01 RX ORDER — NITROGLYCERIN 0.4 MG/1
0.4 TABLET SUBLINGUAL EVERY 5 MIN PRN
Qty: 25 TABLET | Refills: 0 | Status: SHIPPED | OUTPATIENT
Start: 2018-06-01 | End: 2018-08-29

## 2018-06-01 NOTE — TELEPHONE ENCOUNTER
"Requested Prescriptions   Pending Prescriptions Disp Refills     hydrochlorothiazide 12.5 MG TABS tablet [Pharmacy Med Name: HYDROCHLOROTHIAZIDE 12.5MG TABS] 30 tablet 0    Last Written Prescription Date:  4-25-18  Last Fill Quantity: 30,  # refills: 0   Last office visit: 11/15/2017 with prescribing provider:     Future Office Visit:   Next 5 appointments (look out 90 days)     Jun 14, 2018 10:00 AM CDT   SHORT with Lauren Anneliese Engelmann, MD   [facility]    [address] [place] [id number] [phone]                  Sig: TAKE ONE TABLET BY MOUTH EVERY DAY    Diuretics (Including Combos) Protocol Failed    5/31/2018 12:00 PM       Failed - Blood pressure under 140/90 in past 12 months    BP Readings from Last 3 Encounters:   11/15/17 145/83   10/13/17 156/84   08/03/11 129/86                Failed - Normal serum creatinine on file in past 12 months    Recent Labs   Lab Test  10/13/17   1055   CR  1.06*             Passed - Recent (12 mo) or future (30 days) visit within the authorizing provider's specialty    Patient had office visit in the last 12 months or has a visit in the next 30 days with authorizing provider or within the authorizing provider's specialty.  See \"Patient Info\" tab in inbasket, or \"Choose Columns\" in Meds & Orders section of the refill encounter.           Passed - Patient is age 18 or older       Passed - No active pregancy on record       Passed - Normal serum potassium on file in past 12 months    Recent Labs   Lab Test  10/13/17   1055   POTASSIUM  4.1                   Passed - Normal serum sodium on file in past 12 months    Recent Labs   Lab Test  10/13/17   1055   NA  138             Passed - No positive pregnancy test in past 12 months        pravastatin (PRAVACHOL) 40 MG tablet [Pharmacy Med Name: PRAVASTATIN SODIUM 40MG TABS] 30 tablet 0    Last Written Prescription Date:  4-25-18  Last " "Fill Quantity: 30,  # refills: 0   Last office visit: 11/15/2017 with prescribing provider:     Future Office Visit:   Next 5 appointments (look out 90 days)     Jun 14, 2018 10:00 AM CDT   SHORT with Lauren Anneliese Engelmann, MD   [facility]    [address] [facility] [address]   [phone]                  Sig: TAKE ONE TABLET BY MOUTH EVERY DAY    Statins Protocol Passed    5/31/2018 12:00 PM       Passed - LDL on file in past 12 months    Recent Labs   Lab Test  10/13/17   1055   LDL  131*            Passed - No abnormal creatine kinase in past 12 months    Recent Labs   Lab Test  07/19/12   1032   CKT  75               Passed - Recent (12 mo) or future (30 days) visit within the authorizing provider's specialty    Patient had office visit in the last 12 months or has a visit in the next 30 days with authorizing provider or within the authorizing provider's specialty.  See \"Patient Info\" tab in inbasket, or \"Choose Columns\" in Meds & Orders section of the refill encounter.           Passed - Patient is age 18 or older       Passed - No active pregnancy on record       Passed - No positive pregnancy test in past 12 months        carvedilol (COREG CR) 20 MG 24 hr capsule [Pharmacy Med Name: CARVEDILOL PHOSPHATE ER 20MG CP24] 30 capsule 0    Last Written Prescription Date:  4-25-18  Last Fill Quantity: 30,  # refills: 0   Last office visit: 11/15/2017 with prescribing provider:     Future Office Visit:   Next 5 appointments (look out 90 days)     Jun 14, 2018 10:00 AM CDT   SHORT with Lauren Anneliese Engelmann, MD   [facility]    [address] [facility] [address]   [phone]                  Sig: TAKE ONE CAPSULE BY MOUTH ONCE DAILY    Beta-Blockers Protocol Failed    5/31/2018 12:00 PM       Failed - Blood pressure under 140/90 in past " "12 months    BP Readings from Last 3 Encounters:   11/15/17 145/83   10/13/17 156/84   08/03/11 129/86                Passed - Patient is age 6 or older       Passed - Recent (12 mo) or future (30 days) visit within the authorizing provider's specialty    Patient had office visit in the last 12 months or has a visit in the next 30 days with authorizing provider or within the authorizing provider's specialty.  See \"Patient Info\" tab in inbasket, or \"Choose Columns\" in Meds & Orders section of the refill encounter.              "

## 2018-06-01 NOTE — TELEPHONE ENCOUNTER
Waiting of covering provider to address refill message in his in basket.  Patricia Ramires, RN [facility] Triage.

## 2018-06-01 NOTE — TELEPHONE ENCOUNTER
Reason for Call: Request for an order or referral:    Order or referral being requested: BP Medication, Water Medication, Cholesterol and Nitroglycerin     Date needed: as soon as possible    Has the patient been seen by the PCP for this problem? NO    Additional comments: Usually saw Dr Antonio, Now switching to Dr Engelmann. Wants to know if she can get a  month supplies so she doesn't run out.    Phone number Patient can be reached at:  Home number on file [phone] (home) or Cell: [phone]    Best Time:  Anytime    Can we leave a detailed message on this number?  YES    Call taken on 6/1/2018 at 1:28 PM by Cynthia Vidales

## 2018-06-01 NOTE — TELEPHONE ENCOUNTER
Requested Prescriptions   Pending Prescriptions Disp Refills     nitroGLYcerin (NITROSTAT) 0.4 MG sublingual tablet 25 tablet 0     Sig: Place 1 tablet (0.4 mg) under the tongue every 5 minutes as needed    There is no refill protocol information for this order   Last Written Prescription Date:  11/15/17  Last Fill Quantity: 25,  # refills: 0   Last office visit: 11/15/2017 with prescribing provider:     Future Office Visit:   Next 5 appointments (look out 90 days)     Jun 06, 2018 10:00 AM CDT   SHORT with Lauren Anneliese Engelmann, MD   [facility]    [address] [facility] [id number] [phone]                        
Routing refill request to provider for review/approval because:  Protocol failed      Lacey Brambila RN       
normal...

## 2018-06-01 NOTE — TELEPHONE ENCOUNTER
Failed below protocol.    Patient is needing these refilled until she can come in and see Dr. Myles as she is running out and needs today.    Routing refill request to provider for review/approval because:  Failed protocols.  Patricia Ramires RN [facility] Triage.

## 2018-06-04 NOTE — TELEPHONE ENCOUNTER
"Noted, \"receipt confirmed by pharmacy\" note returned, patient is already scheduled for 6/14.  Closing encounter.    Betty Duran RN  [facility]      "

## 2018-06-15 ENCOUNTER — TELEPHONE (OUTPATIENT)
Dept: FAMILY MEDICINE | Facility: CLINIC | Age: 68
End: 2018-06-15

## 2018-06-15 NOTE — TELEPHONE ENCOUNTER
Patient has not been seen by anyone since Dr. Antonio saw her 11/15/17.    She is scheduled to be seen by Dr. Engelmann 7/13/18.    Says she's had a cough for 2 weeks, is getting better.  Denies she is having fever, chest pain, or coughing up anything.    LDL Cholesterol Calculated   Date Value Ref Range Status   10/13/2017 131 (H) <100 mg/dL Final     Comment:     Above desirable:  100-129 mg/dl  Borderline High:  130-159 mg/dL  High:             160-189 mg/dL  Very high:       >189 mg/dl     ]      Advised her the HCTZ usually does not cause cough.   She says she is using Delsym for cough at night, is helping.    She plans to come in fasting to see Dr. Engelmann in July as she might need FLP checked as she stopped her pravastatin and is trying to manage cholesterol with diet.       Advised her to call if cough not continuing to improve or having chest pain, high fever, coughing up green or bloody sputum.    Patient verbalized understanding of and agreement with plan.    Betty Duran RN  [facility]

## 2018-06-15 NOTE — TELEPHONE ENCOUNTER
Reason for call:  Patient reporting a symptom    Symptom or request: patient calling stating that it may be possible that her hydrochlorothiazide 12.5 MG TABS tablet may be causing her cough symptoms. Could this be contributing to her symptoms? Or, Is there a medication that can be prescribed without those side effects?  Patient also stated that she stopped taking pravastatin (PRAVACHOL) 40 MG tablet she states that when she took it at night it would also make her cough. She also states that she is eating a better diet to control her cholesterol, and she feels better not taking this medication.     Duration (how long have symptoms been present): started last week    Have you been treated for this before? No    Additional comments: Please advise patient on what her options can be    Phone Number patient can be reached at:  Home number on file [phone] (home)    Best Time:  any    Can we leave a detailed message on this number:  YES    Call taken on 6/15/2018 at 10:58 AM by REAGAN GUERRA

## 2018-07-03 DIAGNOSIS — I10 HYPERTENSION, GOAL BELOW 140/90: ICD-10-CM

## 2018-07-03 NOTE — TELEPHONE ENCOUNTER
"Requested Prescriptions   Pending Prescriptions Disp Refills     carvedilol (COREG CR) 20 MG 24 hr capsule [Pharmacy Med Name: CARVEDILOL PHOSPHATE ER 20MG CP24] 30 capsule 0    Last Written Prescription Date:  6/1/18  Last Fill Quantity: 30,  # refills: 0   Last office visit: 11/15/2017 with prescribing provider:     Future Office Visit:   Next 5 appointments (look out 90 days)     Jul 13, 2018 10:00 AM CDT   SHORT with Lauren Anneliese Engelmann, MD   [facility]    [address] [place] [phone]   [phone]                  Sig: TAKE ONE CAPSULE BY MOUTH EVERY DAY    Beta-Blockers Protocol Failed    7/3/2018  8:45 AM       Failed - Blood pressure under 140/90 in past 12 months    BP Readings from Last 3 Encounters:   11/15/17 145/83   10/13/17 156/84   08/03/11 129/86                Passed - Patient is age 6 or older       Passed - Recent (12 mo) or future (30 days) visit within the authorizing provider's specialty    Patient had office visit in the last 12 months or has a visit in the next 30 days with authorizing provider or within the authorizing provider's specialty.  See \"Patient Info\" tab in inbasket, or \"Choose Columns\" in Meds & Orders section of the refill encounter.            hydrochlorothiazide 12.5 MG TABS tablet [Pharmacy Med Name: HYDROCHLOROTHIAZIDE 12.5MG TABS] 30 tablet 0    Last Written Prescription Date:  6/1/18  Last Fill Quantity: 30,  # refills: 0   Last office visit: 11/15/2017 with prescribing provider:     Future Office Visit:   Next 5 appointments (look out 90 days)     Jul 13, 2018 10:00 AM CDT   SHORT with Lauren Anneliese Engelmann, MD   [facility]    [address] [facility] [address]   [phone]                  Sig: TAKE ONE TABLET BY MOUTH EVERY DAY    Diuretics (Including Combos) Protocol Failed    " "7/3/2018  8:45 AM       Failed - Blood pressure under 140/90 in past 12 months    BP Readings from Last 3 Encounters:   11/15/17 145/83   10/13/17 156/84   08/03/11 129/86                Failed - Normal serum creatinine on file in past 12 months    Recent Labs   Lab Test  10/13/17   1055   CR  1.06*             Passed - Recent (12 mo) or future (30 days) visit within the authorizing provider's specialty    Patient had office visit in the last 12 months or has a visit in the next 30 days with authorizing provider or within the authorizing provider's specialty.  See \"Patient Info\" tab in inbasket, or \"Choose Columns\" in Meds & Orders section of the refill encounter.           Passed - Patient is age 18 or older       Passed - No active pregancy on record       Passed - Normal serum potassium on file in past 12 months    Recent Labs   Lab Test  10/13/17   1055   POTASSIUM  4.1                   Passed - Normal serum sodium on file in past 12 months    Recent Labs   Lab Test  10/13/17   1055   NA  138             Passed - No positive pregnancy test in past 12 months          "

## 2018-07-05 RX ORDER — HYDROCHLOROTHIAZIDE 12.5 MG/1
TABLET ORAL
Qty: 30 TABLET | Refills: 0 | Status: SHIPPED | OUTPATIENT
Start: 2018-07-05 | End: 2018-07-23

## 2018-07-05 RX ORDER — CARVEDILOL PHOSPHATE 20 MG/1
CAPSULE, EXTENDED RELEASE ORAL
Qty: 30 CAPSULE | Refills: 0 | Status: SHIPPED | OUTPATIENT
Start: 2018-07-05 | End: 2018-07-23

## 2018-07-05 NOTE — TELEPHONE ENCOUNTER
Medication is being filled for 1 time refill only due to:  Patient needs to be seen because elevated BP, due to see new PCP.  She is scheduled for 7/13/18.    Betty Duran RN  [facility]

## 2018-07-23 DIAGNOSIS — I10 HYPERTENSION, GOAL BELOW 140/90: ICD-10-CM

## 2018-07-23 NOTE — TELEPHONE ENCOUNTER
"Requested Prescriptions   Pending Prescriptions Disp Refills     carvedilol (COREG CR) 20 MG 24 hr capsule [Pharmacy Med Name: CARVEDILOL PHOSPHATE ER 20MG CP24] 30 capsule 0    Last Written Prescription Date:  7-5-18  Last Fill Quantity: 30,  # refills: 0   Last office visit: 11/15/2017 with prescribing provider:  ?     Future Office Visit:     Sig: TAKE ONE CAPSULE BY MOUTH EVERY DAY    Beta-Blockers Protocol Failed    7/23/2018 10:46 AM       Failed - Blood pressure under 140/90 in past 12 months    BP Readings from Last 3 Encounters:   11/15/17 145/83   10/13/17 156/84   08/03/11 129/86                Passed - Patient is age 6 or older       Passed - Recent (12 mo) or future (30 days) visit within the authorizing provider's specialty    Patient had office visit in the last 12 months or has a visit in the next 30 days with authorizing provider or within the authorizing provider's specialty.  See \"Patient Info\" tab in inbasket, or \"Choose Columns\" in Meds & Orders section of the refill encounter.            hydrochlorothiazide 12.5 MG TABS tablet [Pharmacy Med Name: HYDROCHLOROTHIAZIDE 12.5MG TABS] 30 tablet 0    Last Written Prescription Date:  7-5-18  Last Fill Quantity: 30,  # refills: 0   Last office visit: 11/15/2017 with prescribing provider:  ?     Future Office Visit:     Sig: TAKE ONE TABLET BY MOUTH EVERY DAY    Diuretics (Including Combos) Protocol Failed    7/23/2018 10:46 AM       Failed - Blood pressure under 140/90 in past 12 months    BP Readings from Last 3 Encounters:   11/15/17 145/83   10/13/17 156/84   08/03/11 129/86                Failed - Normal serum creatinine on file in past 12 months    Recent Labs   Lab Test  10/13/17   1055   CR  1.06*             Passed - Recent (12 mo) or future (30 days) visit within the authorizing provider's specialty    Patient had office visit in the last 12 months or has a visit in the next 30 days with authorizing provider or within the authorizing provider's " "specialty.  See \"Patient Info\" tab in inbasket, or \"Choose Columns\" in Meds & Orders section of the refill encounter.           Passed - Patient is age 18 or older       Passed - No active pregancy on record       Passed - Normal serum potassium on file in past 12 months    Recent Labs   Lab Test  10/13/17   1055   POTASSIUM  4.1                   Passed - Normal serum sodium on file in past 12 months    Recent Labs   Lab Test  10/13/17   1055   NA  138             Passed - No positive pregnancy test in past 12 months          "

## 2018-07-25 RX ORDER — CARVEDILOL PHOSPHATE 20 MG/1
CAPSULE, EXTENDED RELEASE ORAL
Qty: 15 CAPSULE | Refills: 0 | Status: SHIPPED | OUTPATIENT
Start: 2018-07-25 | End: 2018-08-29

## 2018-07-25 RX ORDER — HYDROCHLOROTHIAZIDE 12.5 MG/1
TABLET ORAL
Qty: 15 TABLET | Refills: 0 | Status: SHIPPED | OUTPATIENT
Start: 2018-07-25 | End: 2018-08-29

## 2018-07-25 NOTE — TELEPHONE ENCOUNTER
Routing refill request to provider for review/approval because:  RX Protocol Failed.  Please review.  Thank you.  Carmen Alvarez RN

## 2018-07-30 ENCOUNTER — TELEPHONE (OUTPATIENT)
Dept: FAMILY MEDICINE | Facility: CLINIC | Age: 68
End: 2018-07-30

## 2018-07-30 NOTE — TELEPHONE ENCOUNTER
I have never seen her, so she needs to come in to discuss with me. Thank you.     Lauren Engelmann, MD

## 2018-07-30 NOTE — TELEPHONE ENCOUNTER
Notified patient.  She stated that has an appointment set up with PCP on 8/22/18.  She stated that she has pills until then, and will discuss this at her appointment.    Patient stated understanding and agreeable with the plan of care. Patricia Ramires, RN-BSN, [facility] Triage.

## 2018-07-30 NOTE — TELEPHONE ENCOUNTER
Reason for Call:  Other     Detailed comments: Patient calling stating that it is costing her $40 every 2 weeks for the carvedilol. Patient is wondering if there is a cheaper alternative that she could be prescribed. Patient uses [facility].    Phone Number Patient can be reached at: Home number on file [phone] (home)    Best Time: any    Can we leave a detailed message on this number? YES    Call taken on 7/30/2018 at 10:38 AM by Gracy Ortiz

## 2018-07-31 ENCOUNTER — TELEPHONE (OUTPATIENT)
Dept: FAMILY MEDICINE | Facility: CLINIC | Age: 68
End: 2018-07-31

## 2018-07-31 NOTE — TELEPHONE ENCOUNTER
Routing to PCP to review and advise.      Also sent to TC to mail business card.    Margaret Morillo RN  [facility]

## 2018-07-31 NOTE — TELEPHONE ENCOUNTER
Thanks for the info. I would still advise that she establish with me soon, last clinic visit was November 2017 and she was supposed to come back in 3 months.    Lauren Engelmann, MD

## 2018-07-31 NOTE — TELEPHONE ENCOUNTER
Reason for Call:  Other - Patient Request    Detailed comments: Patient called and wanted to let Dr. Engelmann know that she is going to stay on her same blood pressure medication. She had been considering looking for a cheaper one but decided not to. She wanted to up date Dr. Engelmann on this.    Also, patient asked if Dr. Engelmann has a business card that her care team could mail to patient. Please mail to home address. Verified that home address is current.    Phone Number Patient can be reached at: Home number on file [phone] (home)    Best Time: Anytime    Can we leave a detailed message on this number? YES    Call taken on 7/31/2018 at 8:34 AM by Karla Keita

## 2018-08-06 ENCOUNTER — TELEPHONE (OUTPATIENT)
Dept: FAMILY MEDICINE | Facility: CLINIC | Age: 68
End: 2018-08-06

## 2018-08-06 DIAGNOSIS — I10 HYPERTENSION, GOAL BELOW 140/90: ICD-10-CM

## 2018-08-06 RX ORDER — CARVEDILOL PHOSPHATE 20 MG/1
20 CAPSULE, EXTENDED RELEASE ORAL DAILY
Qty: 15 CAPSULE | Refills: 0 | Status: CANCELLED | OUTPATIENT
Start: 2018-08-06

## 2018-08-06 RX ORDER — HYDROCHLOROTHIAZIDE 12.5 MG/1
12.5 TABLET ORAL DAILY
Qty: 15 TABLET | Refills: 0 | Status: CANCELLED | OUTPATIENT
Start: 2018-08-06

## 2018-08-06 NOTE — TELEPHONE ENCOUNTER
Reason for Call:  Other     Detailed comments: patient had to change her appointment with PCP due to insurance  but she will need 7 pills of her medication to hold her she can be seen again she said she will run out of meds on 8/29/18  hydrochlorothiazide 12.5 MG TABS tablet  carvedilol (COREG CR) 20 MG 24 hr capsule [facility] [place], [place] - [address]    Phone Number Patient can be reached at: Home number on file [phone] (home)    Best Time: any    Can we leave a detailed message on this number? YES    Call taken on 8/6/2018 at 8:20 AM by Dayana Nguyen

## 2018-08-07 RX ORDER — HYDROCHLOROTHIAZIDE 12.5 MG/1
12.5 TABLET ORAL DAILY
Qty: 15 TABLET | Refills: 0 | Status: CANCELLED | OUTPATIENT
Start: 2018-08-07

## 2018-08-07 RX ORDER — CARVEDILOL PHOSPHATE 20 MG/1
20 CAPSULE, EXTENDED RELEASE ORAL DAILY
Qty: 15 CAPSULE | Refills: 0 | Status: CANCELLED | OUTPATIENT
Start: 2018-08-07

## 2018-08-07 NOTE — TELEPHONE ENCOUNTER
Patient has been informed of below via detailed VM per her noted permission below. I asked that she reschedule her appt sooner so she does not run out of medications.  Carmen Alvarez RN

## 2018-08-07 NOTE — TELEPHONE ENCOUNTER
Before I could finishing documenting the message off RN line the 9/5/18 appointment now is rescheduled to 8/29/18.    See first note, that is the day she said she would run out.    I called the pharmacy who advises they sold 15 tabs of carvedilol and hydrochlorothiazide to her on 7/30/18.   Perhaps she had some left over.    Closing encounter as her notes indicated she'd run out on 8/29.    Betty Duran RN  [facility]

## 2018-08-07 NOTE — TELEPHONE ENCOUNTER
Refill request denied. I have openings today and can see her in clinic to discuss refills  She has not been seen since Nov 2017.  Her BP has been poorly controlled. She has asked for refills on several occasions and then cancelled appointment.

## 2018-08-07 NOTE — TELEPHONE ENCOUNTER
Unfortunately, when I saw patient in the fall she did not follow up as advised  I see she switched to a different primary care provider and again has not followed up as advised   Please see refill requests from 12/18/17, 4/23/18, 5/31/18, 7/3/18 all where patient requested short term refill of blood pressure medication and planned to follow up in clinic and then subsequently cancelled appointment  If she has financial or transportation concerns, I would be happy to put in referral to our care coordinator  There are several openings in clinic this week, including with Dr. Engelmann later in the week

## 2018-08-07 NOTE — TELEPHONE ENCOUNTER
Spoke with patient.  She has an appt on 9/5.  Stressed the importance of keeping this appointment and why routine follow up appts are needed. Offered appt today which patient declined stating she has transportation issues.  Her sister will be bringing her in to Appt on 9/5.      Patient states she has been monitoring her BP at home and it is running under 135/70.  Recommended she keep a log of her BP readings and bring them in to her appointment. Patient agreed to do this.    Writer got the impression that patient may be having financial difficulties and/or transportation issues so this may be why she has cancelled previous appts. She mentioned multiple times about her Co-pays of $40 and being on social security. I let her know that we have resources available which may be able to help with transportation and financial issues if needed which she declined/denied.    She confirms that she will be keep appt on 9/5.    Patient is asking for additional short term refill to get her through to appt.    Please advise.  Thank you.  Carmen Alvarez RN

## 2018-08-07 NOTE — TELEPHONE ENCOUNTER
"Patient called back to RN line and left a length detailed message: states she can only come in on 9/5/18; she says she cancelled the previous appointment as she had enough medication at that time.   She says she just needs additional BP medicine to get to the September 5 appt.   If we can't \"help her out\" then she will have to go without her meds and hopefully she \"won't have a problem\".   States her BP has been good on her meds, if she had a problem she would certainly have let us know.     She states she is \"really disappointed\" that we are not understanding where she is coming from.     She also clarified that Dr. Engelmann is her doctor, not Marie Webb.    She hopes we can help her out by refilling her medicine to get her to the 9/5/18 visit now scheduled with Dr. Engelmann.    Routed to Dr. Engelmann to address.    Betty Duran RN  [facility]      "

## 2018-08-07 NOTE — TELEPHONE ENCOUNTER
I have never seen her. I know that she has an appointment with me, but I recommend sending this to Marie Webb as she has actually met the patient and discussed hypertension with her in the past. This is most appropriate.    Lauren Engelmann, MD

## 2018-08-28 NOTE — PROGRESS NOTES
SUBJECTIVE:   Deanna Butler is a 68 year old female who presents to clinic today for the following health issues:    Hypertension Follow-up      Outpatient blood pressures are being checked at home.  Results are 130's to 70's.    Low Salt Diet: no added salt    Amount of exercise or physical activity: Walking     Problems taking medications regularly: No    Medication side effects: none    Diet: low salt      Rash      Duration: years    Description  Location: lower legs, anterior   Itching: no    Intensity:  moderate    Accompanying signs and symptoms: erythema and swelling of legs    History (similar episodes/previous evaluation): she has been previously diagnosed with venous stasis dermatitis    Precipitating or alleviating factors:  New exposures:  None  Recent travel: no      Therapies tried and outcome: none    Diabetes Follow-up      Patient is checking blood sugars: rarely.  Uses her 's meter and she doesn't record them.     Diabetic concerns: None     Symptoms of hypoglycemia (low blood sugar): none     Paresthesias (numbness or burning in feet) or sores: No     Date of last diabetic eye exam: does not recall    BP Readings from Last 2 Encounters:   08/29/18 148/74   11/15/17 145/83     Hemoglobin A1C (%)   Date Value   10/13/2017 6.3 (H)   07/19/2012 6.8 (H)     LDL Cholesterol Calculated (mg/dL)   Date Value   10/13/2017 131 (H)   07/19/2012 72       Diabetes Management Resources  Hyperlipidemia Follow-Up      Rate your low fat/cholesterol diet?: fair    Taking statin?  Stopped due to fear of taking too many pills    Other lipid medications/supplements?:  none    Chronic Kidney Disease Follow-up      Current NSAID use?  No      Problem list and histories reviewed & adjusted, as indicated.  Additional history: as documented    Patient Active Problem List   Diagnosis     CAD (coronary artery disease)     Hyperlipidemia LDL goal <100     CKD (chronic kidney disease) stage 3, GFR 30-59 ml/min      Type 2 diabetes, HbA1C goal < 8% (H)     Venous stasis dermatitis of both lower extremities     Morbid obesity (H)     Hypertension goal BP (blood pressure) < 150/90     History reviewed. No pertinent surgical history.    Social History   Substance Use Topics     Smoking status: Former Smoker     Quit date: 9/16/1980     Smokeless tobacco: Never Used     Alcohol use No      Comment: occasional wine     Family History   Problem Relation Age of Onset     Hypertension Mother      Arthritis Mother      Cancer Father      Cancer Brother      HEART DISEASE Sister            Reviewed and updated as needed this visit by clinical staff  Tobacco  Allergies  Meds  Med Hx  Surg Hx  Fam Hx  Soc Hx      Reviewed and updated as needed this visit by Provider         ROS:  Constitutional, HEENT, cardiovascular, pulmonary, GI, , musculoskeletal, neuro, skin, endocrine and psych systems are negative, except as otherwise noted.    OBJECTIVE:     /74 (BP Location: Right arm, Patient Position: Chair, Cuff Size: Adult Large)  Pulse 80  Temp 97  F (36.1  C) (Oral)  Wt 254 lb (115.2 kg)  SpO2 99%  BMI 46.46 kg/m2  Body mass index is 46.46 kg/(m^2).  GENERAL: alert, no distress and obese  EYES: Eyes grossly normal to inspection, PERRL and conjunctivae and sclerae normal  NECK: no adenopathy, no asymmetry, masses, or scars and thyroid normal to palpation  RESP: lungs clear to auscultation - no rales, rhonchi or wheezes  CV: regular rates and rhythm, normal S1 S2, no S3 or S4, no murmur, click or rub and peripheral pulses strong  MS: non-pitting edema to mid-calf bilaterally and peripheral pulses normal  SKIN: thickened skin on anterior lower legs with venous stasis changes   BACK: no CVA tenderness, no paralumbar tenderness  PSYCH: mentation appears normal, affect normal/bright    Diagnostic Test Results:  No results found for this or any previous visit (from the past 24 hour(s)).    ASSESSMENT/PLAN:       ICD-10-CM   "  1. Hypertension goal BP (blood pressure) < 150/90 I10 HEMOGLOBIN A1C   2. Hyperlipidemia LDL goal <100 E78.5 Lipid panel reflex to direct LDL Fasting   3. CKD (chronic kidney disease) stage 3, GFR 30-59 ml/min N18.3 CANCELED: Albumin Random Urine Quantitative with Creat Ratio   4. Venous stasis dermatitis of both lower extremities I87.2 ammonium lactate (LAC-HYDRIN) 12 % cream   5. Screen for colon cancer Z12.11 Fecal colorectal cancer screen (FIT)     Patient declines further titration of BP meds today - states that her prior PCP says the \"top number can be 160\" although I see no documentation of this. I explained risk of stroke and MI with uncontrolled BP.   Off statin, will check lipids today. I encouraged her to restart given hx of HTN and CAD.   Refuses urine albumin today.  Advised to not use NSAIDs due to CKD.   Amlactin to lower legs. Elevate them as well.  Wants FIT but declines mammogram after counseling - advised that self breast exams do not always identify masses.     FUTURE LABS:       - Schedule fasting labs in 6 months  FUTURE APPOINTMENTS:       - Follow-up visit in 6 months for annual exam       - Follow-up for annual visit or as needed  See Patient Instructions    Lauren A. Engelmann, MD  [facility]    "

## 2018-08-29 ENCOUNTER — OFFICE VISIT (OUTPATIENT)
Dept: FAMILY MEDICINE | Facility: CLINIC | Age: 68
End: 2018-08-29
Payer: COMMERCIAL

## 2018-08-29 VITALS
HEART RATE: 80 BPM | OXYGEN SATURATION: 99 % | BODY MASS INDEX: 46.46 KG/M2 | SYSTOLIC BLOOD PRESSURE: 148 MMHG | DIASTOLIC BLOOD PRESSURE: 74 MMHG | TEMPERATURE: 97 F | WEIGHT: 254 LBS

## 2018-08-29 DIAGNOSIS — N18.30 CKD (CHRONIC KIDNEY DISEASE) STAGE 3, GFR 30-59 ML/MIN (H): ICD-10-CM

## 2018-08-29 DIAGNOSIS — Z12.11 SCREEN FOR COLON CANCER: ICD-10-CM

## 2018-08-29 DIAGNOSIS — I10 HYPERTENSION GOAL BP (BLOOD PRESSURE) < 150/90: Primary | ICD-10-CM

## 2018-08-29 DIAGNOSIS — E78.5 HYPERLIPIDEMIA LDL GOAL <100: ICD-10-CM

## 2018-08-29 DIAGNOSIS — I87.2 VENOUS STASIS DERMATITIS OF BOTH LOWER EXTREMITIES: ICD-10-CM

## 2018-08-29 PROBLEM — E66.01 MORBID OBESITY (H): Status: ACTIVE | Noted: 2018-08-29

## 2018-08-29 LAB
CHOLEST SERPL-MCNC: 207 MG/DL
HBA1C MFR BLD: 6.5 % (ref 0–5.6)
HDLC SERPL-MCNC: 50 MG/DL
LDLC SERPL CALC-MCNC: 118 MG/DL
NONHDLC SERPL-MCNC: 157 MG/DL
TRIGL SERPL-MCNC: 194 MG/DL

## 2018-08-29 PROCEDURE — 83036 HEMOGLOBIN GLYCOSYLATED A1C: CPT | Performed by: FAMILY MEDICINE

## 2018-08-29 PROCEDURE — 80061 LIPID PANEL: CPT | Performed by: FAMILY MEDICINE

## 2018-08-29 PROCEDURE — 99214 OFFICE O/P EST MOD 30 MIN: CPT | Performed by: FAMILY MEDICINE

## 2018-08-29 PROCEDURE — 36415 COLL VENOUS BLD VENIPUNCTURE: CPT | Performed by: FAMILY MEDICINE

## 2018-08-29 RX ORDER — AMMONIUM LACTATE 12 G/100G
CREAM TOPICAL 2 TIMES DAILY PRN
Qty: 385 G | Refills: 3 | Status: SHIPPED | OUTPATIENT
Start: 2018-08-29 | End: 2019-01-24

## 2018-08-29 RX ORDER — CARVEDILOL PHOSPHATE 20 MG/1
20 CAPSULE, EXTENDED RELEASE ORAL DAILY
Qty: 90 CAPSULE | Refills: 1 | Status: SHIPPED | OUTPATIENT
Start: 2018-08-29 | End: 2018-12-27

## 2018-08-29 RX ORDER — NITROGLYCERIN 0.4 MG/1
0.4 TABLET SUBLINGUAL EVERY 5 MIN PRN
Qty: 25 TABLET | Refills: 0 | COMMUNITY
Start: 2018-08-29

## 2018-08-29 RX ORDER — HYDROCHLOROTHIAZIDE 12.5 MG/1
12.5 TABLET ORAL DAILY
Qty: 90 TABLET | Refills: 1 | Status: SHIPPED | OUTPATIENT
Start: 2018-08-29 | End: 2018-12-27

## 2018-08-29 ASSESSMENT — PAIN SCALES - GENERAL: PAINLEVEL: NO PAIN (0)

## 2018-08-29 NOTE — MR AVS SNAPSHOT
After Visit Summary   8/29/2018    Deanna Butler    MRN: [id number]           Patient Information     Date Of Birth          1950        Visit Information        Provider Department      8/29/2018 9:00 AM Engelmann, Lauren Anneliese, MD [facility]        Today's Diagnoses     Hypertension goal BP (blood pressure) < 150/90    -  1    Hyperlipidemia LDL goal <100        CKD (chronic kidney disease) stage 3, GFR 30-59 ml/min        Venous stasis dermatitis of both lower extremities        Screen for colon cancer           Follow-ups after your visit        Your next 10 appointments already scheduled     Jan 31, 2019  9:00 AM CST   PHYSICAL with Lauren Anneliese Engelmann, MD   [facility]    [address] [place] [address] [id number] [phone]              Future tests that were ordered for you today     Open Future Orders        Priority Expected Expires Ordered    Fecal colorectal cancer screen (FIT) Routine 9/19/2018 11/21/2018 8/29/2018            Who to contact     If you have questions or need follow up information about today's clinic visit or your schedule please contact [facility] directly at [phone].  Normal or non-critical lab and imaging results will be communicated to you by MyChart, letter or phone within 4 business days after the clinic has received the results. If you do not hear from us within 7 days, please contact the clinic through [employer] or phone. If you have a critical or abnormal lab result, we will notify you by phone as soon as possible.  Submit refill requests through [employer] or call your pharmacy and they will forward the refill request to us. Please allow 3 business days for your refill to be completed.          Additional Information About Your Visit        [employer] Information     [employer] lets you send messages to your doctor, view  "your test results, renew your prescriptions, schedule appointments and more. To sign up, go to [email or web address] . Click on \"Log in\" on the left side of the screen, which will take you to the Welcome page. Then click on \"Sign up Now\" on the right side of the page.     You will be asked to enter the access code listed below, as well as some personal information. Please follow the directions to create your username and password.     Your access code is: [email or web address]  Expires: 2018 10:32 AM     Your access code will  in 90 days. If you need help or a new code, please call your [facility] clinic or [phone].        Care EveryWhere ID     This is your Care EveryWhere ID. This could be used by other organizations to access your [facility] medical records  [id number]        Your Vitals Were     Pulse Temperature Pulse Oximetry BMI (Body Mass Index)          80 97  F (36.1  C) (Oral) 99% 46.46 kg/m2         Blood Pressure from Last 3 Encounters:   18 148/74   11/15/17 145/83   10/13/17 156/84    Weight from Last 3 Encounters:   18 254 lb (115.2 kg)   11/15/17 286 lb (129.7 kg)   10/13/17 283 lb (128.4 kg)              We Performed the Following     HEMOGLOBIN A1C     Lipid panel reflex to direct LDL Fasting          Today's Medication Changes          These changes are accurate as of 18  9:48 AM.  If you have any questions, ask your nurse or doctor.               Start taking these medicines.        Dose/Directions    ACE/ARB/ARNI NOT PRESCRIBED (INTENTIONAL)   Started by:  Engelmann, Lauren Anneliese, MD        Please choose reason not prescribed, below   Refills:  0       ammonium lactate 12 % cream   Commonly known as:  LAC-HYDRIN   Used for:  Venous stasis dermatitis of both lower extremities   Started by:  Engelmann, Lauren Anneliese, MD        Apply topically 2 times daily as needed for dry skin   Quantity:  385 g   Refills:  3         These medicines have changed or have updated " prescriptions.        Dose/Directions    carvedilol 20 MG 24 hr capsule   Commonly known as:  COREG CR   This may have changed:  See the new instructions.   Changed by:  Engelmann, Lauren Anneliese, MD        Dose:  20 mg   Take 1 capsule (20 mg) by mouth daily   Quantity:  90 capsule   Refills:  1       hydrochlorothiazide 12.5 MG Tabs tablet   This may have changed:  See the new instructions.   Changed by:  Engelmann, Lauren Anneliese, MD        Dose:  12.5 mg   Take 1 tablet (12.5 mg) by mouth daily   Quantity:  90 tablet   Refills:  1            Where to get your medicines      These medications were sent to [facility] [place] [facility] [address]  [address], [facility] [address]     Phone:  [phone]     ammonium lactate 12 % cream    carvedilol 20 MG 24 hr capsule    hydrochlorothiazide 12.5 MG Tabs tablet         Some of these will need a paper prescription and others can be bought over the counter.  Ask your nurse if you have questions.     You don't need a prescription for these medications     ACE/ARB/ARNI NOT PRESCRIBED (INTENTIONAL)                Primary Care Provider Office Phone # Fax #    Lauren Anneliese Engelmann, [phone]       [facility] [address] [facility] [address]        Equal Access to Services     MARAH FERRER AH: Hadii aad ku hadasho Soomaali, waaxda luqadaha, qaybta kaalmada adeegyada, cheyenne earl. So [facility] [phone].    ATENCIÓN: Si habla español, tiene a carlisle disposición servicios gratuitos de asistencia lingüística. Llame al [phone].    We comply with applicable federal civil rights laws and [place] laws. We do not discriminate on the basis of race, color, national origin, age, disability, sex, sexual orientation, or gender identity.            Thank you!     Thank you for choosing [facility]  for your care. Our  goal is always to provide you with excellent care. Hearing back from our patients is one way we can continue to improve our services. Please take a few minutes to complete the written survey that you may receive in the mail after your visit with us. Thank you!             Your Updated Medication List - Protect others around you: Learn how to safely use, store and throw away your medicines at www.disposemymeds.org.          This list is accurate as of 8/29/18  9:48 AM.  Always use your most recent med list.                   Brand Name Dispense Instructions for use Diagnosis    ACE/ARB/ARNI NOT PRESCRIBED (INTENTIONAL)      Please choose reason not prescribed, below        ammonium lactate 12 % cream    LAC-HYDRIN    385 g    Apply topically 2 times daily as needed for dry skin    Venous stasis dermatitis of both lower extremities       ASPIRIN PO      Take 81 mg by mouth        carvedilol 20 MG 24 hr capsule    COREG CR    90 capsule    Take 1 capsule (20 mg) by mouth daily        cholecalciferol 400 UNIT Tabs tablet    vitamin D3     Take 400 Units by mouth daily.        fish oil-omega-3 fatty acids 1000 MG capsule      Take 2 g by mouth daily.        hydrochlorothiazide 12.5 MG Tabs tablet     90 tablet    Take 1 tablet (12.5 mg) by mouth daily        nitroGLYcerin 0.4 MG sublingual tablet    NITROSTAT    25 tablet    Place 1 tablet (0.4 mg) under the tongue every 5 minutes as needed        order for DME     1 Device    Equipment being ordered: BP machine.    Hypertension, goal below 140/90

## 2018-08-30 ENCOUNTER — TELEPHONE (OUTPATIENT)
Dept: FAMILY MEDICINE | Facility: CLINIC | Age: 68
End: 2018-08-30
Payer: COMMERCIAL

## 2018-08-30 DIAGNOSIS — I25.10 CORONARY ARTERY DISEASE INVOLVING NATIVE HEART WITHOUT ANGINA PECTORIS, UNSPECIFIED VESSEL OR LESION TYPE: Primary | ICD-10-CM

## 2018-08-30 RX ORDER — SIMVASTATIN 20 MG
20 TABLET ORAL AT BEDTIME
Qty: 30 TABLET | Refills: 1 | Status: SHIPPED | OUTPATIENT
Start: 2018-08-30 | End: 2018-09-06

## 2018-08-30 NOTE — TELEPHONE ENCOUNTER
We will try simvastatin 20mg daily - morning or evening are ok. I sent this to the pharmacy.    If she would like, she can meet our diabetic educator to try and make other changes to help her sugars before restarting medication. This will also help with her weight loss goals.

## 2018-08-30 NOTE — TELEPHONE ENCOUNTER
Notified patient of the message below per PCP.    Patient is willing to start meds for diabetes and cholesterol.    She stated that she will not take the Pravastatin again and it made her sick and gave her a cough.    Patricia Ramires RN CPC Triage.

## 2018-08-30 NOTE — TELEPHONE ENCOUNTER
Reason for Call:  Other / Patient is requesting a call back to discuss lab results    Detailed comments: Patient called and stated she is waiting on a call back to discuss her lab results.    Phone Number Patient can be reached at: Home number on file [phone] (home)    Best Time: Anytime    Can we leave a detailed message on this number? YES    Call taken on 8/30/2018 at 12:45 PM by Jovita Aquino

## 2018-08-30 NOTE — TELEPHONE ENCOUNTER
I called patient, advised her of message from Dr. Engelmann, she verbalized understanding and will report any unusual aches and pains.    She declines diabetes educator right now, she will try watching her diet and exercising more.    Says her blood sugar was 130 this afternoon.   Her BP was 135/67 with pulse 73.    She wants Dr. Engelmann to see these good numbers.    Routed to Dr Engelmann as FYI, can close encounter.    Betty Duran RN  [facility]

## 2018-08-30 NOTE — TELEPHONE ENCOUNTER
Please let her know that she has both diabetes and high cholesterol.     I highly recommend that she takes medication for both of these conditions. Along with her high blood pressure, these issues can lead to stroke, heart attack, and kidney disease.     I do not think supplements will be sufficient. I advise that she goes back on pravastatin.     Also, please let her know that labs can take about 2-3 days to come back and that we will notify her in the future when they are reviewed.    Lauren Engelmann, MD

## 2018-08-30 NOTE — TELEPHONE ENCOUNTER
Reason for Call:  Other / Patient requests a call    Detailed comments: Patient called and stated she had labs done yesterday and would like to get a call to discuss results as soon as they are ready.  Patient also stated that she is taking a garlic supplement and vitamins, and that if her glucose is on the border line, she would also prefer to have her body deal with it instead of taking any medication.  Please call patient back to discuss.    Phone Number Patient can be reached at: Home number on file [phone] (home)    Best Time: Anytime    Can we leave a detailed message on this number? YES    Call taken on 8/30/2018 at 9:05 AM by Jovita Aquino

## 2018-09-04 ENCOUNTER — TELEPHONE (OUTPATIENT)
Dept: FAMILY MEDICINE | Facility: CLINIC | Age: 68
End: 2018-09-04

## 2018-09-04 DIAGNOSIS — I25.10 CORONARY ARTERY DISEASE INVOLVING NATIVE HEART WITHOUT ANGINA PECTORIS, UNSPECIFIED VESSEL OR LESION TYPE: Primary | ICD-10-CM

## 2018-09-04 NOTE — TELEPHONE ENCOUNTER
Attempt # 1  Called patient at home number.  Was call answered?  Yes,   Patient states would like to watch her diet instead of taking the Zocor (Simvastatin)?  Has a cough and sneezing started 2 days after taking the medications, takes OTC allergy medication, was in clinic on Wednesday. States does not want to take medication unless has to.  States is going to stop taking it - not taking it tonight. Would like to take something natural  Patient states has lost 5 pounds since last visit.  Is watching diet closely - exercising - feels better.   Denies breathing issues or swelling/itching.  Nurse advised patient to stay away from Sina, red meats. Eat plenty fresh fruit and vegetables. Patient verbalized understanding and agreement with dorothy Morillo RN  [facility]

## 2018-09-04 NOTE — TELEPHONE ENCOUNTER
Reason for call:  Patient reporting a symptom    Symptom or request: Patient concerned that she might be having an allergic reaction to her high cholesterol medication - would like to change meds    Duration (how long have symptoms been present):     Have you been treated for this before? No    Additional comments:     Phone Number patient can be reached at:  Home number on file [phone] (home)    Best Time:      Can we leave a detailed message on this number:  YES    Call taken on 9/4/2018 at 8:32 AM by Nicci Hood

## 2018-09-05 NOTE — TELEPHONE ENCOUNTER
Called and notified patient of the message below.  Patient stated that she will take medication and does not want anything that is a statin.  She stated that she did not take the med last night and she is better.    Routed to PCP to advise.  Patricia Ramires RN CPC Triage.

## 2018-09-05 NOTE — TELEPHONE ENCOUNTER
She has had a heart attack in the past. I do not think it's safe to use something natural for her cholesterol despite her weight loss. Thanks.    Lauren Engelmann, MD

## 2018-09-05 NOTE — TELEPHONE ENCOUNTER
Patient called back and wanted to know if she would be prescribed a medication with out Statin in it please call to discuss  [phone]

## 2018-09-06 RX ORDER — FENOFIBRATE 145 MG/1
145 TABLET, COATED ORAL DAILY
Qty: 30 TABLET | Refills: 1 | Status: SHIPPED | OUTPATIENT
Start: 2018-09-06 | End: 2018-09-06

## 2018-09-06 NOTE — TELEPHONE ENCOUNTER
"I called patient back, she says she read the side effects of the fenofibrate and is not comfortable putting a medication into her body that can affect her kidneys, pancreas, and liver.    She says she has cut out pizza, cheese, red meat, doughnuts, she is exercising more and repeats that she has lost the 5 pounds \"on her own\".    She is not going to take the fenofibrate or the simvastatin.  She says this whole issue with the cholesterol is upsetting her and she wants to get the statin out of her system and then see how she feels.  She says she wants her body to relax and adjust to her new diet and lifestyle.    Routed to Dr. Engelmann as ANTONIO.    Betty Duran RN  [facility]      "

## 2018-09-06 NOTE — TELEPHONE ENCOUNTER
Patient caled with questions about the medication that she was prescribed Fenofibrate please call to discuss

## 2018-12-27 DIAGNOSIS — I25.10 CORONARY ARTERY DISEASE INVOLVING NATIVE HEART WITHOUT ANGINA PECTORIS, UNSPECIFIED VESSEL OR LESION TYPE: Primary | ICD-10-CM

## 2018-12-27 NOTE — TELEPHONE ENCOUNTER
"Requested Prescriptions   Pending Prescriptions Disp Refills     carvedilol ER (COREG CR) 20 MG 24 hr capsule [Pharmacy Med Name: CARVEDILOL PHOSPHATE ER 20MG CP24] 90 capsule 1    Last Written Prescription Date:  8-29-18  Last Fill Quantity: 90,  # refills: 1   Last office visit: 8/29/2018 with prescribing provider:  8-29-18   Future Office Visit:   Next 5 appointments (look out 90 days)    Mar 13, 2019  8:40 AM CDT  PHYSICAL with Lauren Anneliese [facility], [facility] [address] [place] [id number]  [phone]          Sig: TAKE ONE CAPSULE BY MOUTH EVERY DAY    Beta-Blockers Protocol Failed - 12/27/2018  9:49 AM       Failed - Blood pressure under 140/90 in past 12 months    BP Readings from Last 3 Encounters:   08/29/18 148/74   11/15/17 145/83   10/13/17 156/84                Passed - Patient is age 6 or older       Passed - Recent (12 mo) or future (30 days) visit within the authorizing provider's specialty    Patient had office visit in the last 12 months or has a visit in the next 30 days with authorizing provider or within the authorizing provider's specialty.  See \"Patient Info\" tab in inbasket, or \"Choose Columns\" in Meds & Orders section of the refill encounter.              hydrochlorothiazide (HYDRODIURIL) 12.5 MG tablet [Pharmacy Med Name: HYDROCHLOROTHIAZIDE 12.5MG TABS] 90 tablet 1    Last Written Prescription Date:  8-29-18  Last Fill Quantity: 90,  # refills: 1   Last office visit: 8/29/2018 with prescribing provider:  8-29-18   Future Office Visit:   Next 5 appointments (look out 90 days)    Mar 13, 2019  8:40 AM CDT  PHYSICAL with Lauren Anneliese [facility]mann, [facility]  [facility] [address] [facility] [id number]  [phone]          Sig: TAKE ONE TABLET BY MOUTH EVERY DAY    Diuretics (Including Combos) Protocol Failed " "- 12/27/2018  9:49 AM       Failed - Blood pressure under 140/90 in past 12 months    BP Readings from Last 3 Encounters:   08/29/18 148/74   11/15/17 145/83   10/13/17 156/84                Failed - Normal serum creatinine on file in past 12 months    Recent Labs   Lab Test 10/13/17  1055   CR 1.06*             Failed - Normal serum potassium on file in past 12 months    Recent Labs   Lab Test 10/13/17  1055   POTASSIUM 4.1                   Failed - Normal serum sodium on file in past 12 months    Recent Labs   Lab Test 10/13/17  1055                Passed - Recent (12 mo) or future (30 days) visit within the authorizing provider's specialty    Patient had office visit in the last 12 months or has a visit in the next 30 days with authorizing provider or within the authorizing provider's specialty.  See \"Patient Info\" tab in inbasket, or \"Choose Columns\" in Meds & Orders section of the refill encounter.             Passed - Patient is age 18 or older       Passed - No active pregancy on record       Passed - No positive pregnancy test in past 12 months          "

## 2018-12-28 RX ORDER — HYDROCHLOROTHIAZIDE 12.5 MG/1
TABLET ORAL
Qty: 90 TABLET | Refills: 1 | Status: SHIPPED | OUTPATIENT
Start: 2018-12-28 | End: 2019-05-22

## 2018-12-28 RX ORDER — CARVEDILOL PHOSPHATE 20 MG/1
CAPSULE, EXTENDED RELEASE ORAL
Qty: 90 CAPSULE | Refills: 1 | Status: SHIPPED | OUTPATIENT
Start: 2018-12-28 | End: 2019-01-24

## 2018-12-28 NOTE — TELEPHONE ENCOUNTER
Routing refill request to provider for review/approval because:  Failed BP.  Patricia Ramires RN CPC Triage.

## 2018-12-30 ENCOUNTER — TRANSFERRED RECORDS (OUTPATIENT)
Dept: HEALTH INFORMATION MANAGEMENT | Facility: CLINIC | Age: 68
End: 2018-12-30

## 2018-12-31 ENCOUNTER — TELEPHONE (OUTPATIENT)
Dept: FAMILY MEDICINE | Facility: CLINIC | Age: 68
End: 2018-12-31

## 2018-12-31 DIAGNOSIS — N20.0 KIDNEY STONE: Primary | ICD-10-CM

## 2018-12-31 RX ORDER — DICLOFENAC SODIUM 75 MG/1
TABLET, DELAYED RELEASE ORAL
Qty: 40 TABLET | Refills: 0 | Status: SHIPPED | OUTPATIENT
Start: 2018-12-31 | End: 2019-01-08

## 2018-12-31 RX ORDER — TAMSULOSIN HYDROCHLORIDE 0.4 MG/1
0.4 CAPSULE ORAL AT BEDTIME
Qty: 5 CAPSULE | Refills: 0 | Status: SHIPPED | OUTPATIENT
Start: 2018-12-31 | End: 2019-01-08

## 2018-12-31 NOTE — TELEPHONE ENCOUNTER
Please see ER visit from yesterday in epic.  Routed to covering provider to please advise.  Patricia Ramires RN [facility] Triage.

## 2018-12-31 NOTE — TELEPHONE ENCOUNTER
Notified patient of the message below per provider.  Patient stated understanding and agreeable with the plan of care. Patricia Ramires, RN-BSN, [facility] Triage.

## 2018-12-31 NOTE — TELEPHONE ENCOUNTER
Reason for Call:  Other prescription    Detailed comments: Patient was diagnosed in ER with a kidney stone.  It's 1cm in size.  However, patient states she still has some pain and is wondering if Dr. Engelmann will prescribe something stronger than a NSaid, that the hospital folks told her to take.  Patient uses [facility].      Phone Number Patient can be reached at: Home number on file [phone] (home)    Best Time: anytime    Can we leave a detailed message on this number? YES    Call taken on 12/31/2018 at 8:34 AM by Lili Vale

## 2018-12-31 NOTE — TELEPHONE ENCOUNTER
Called patient at [phone] (home). Left message on voicemail to return phone call to triage line at [phone].  Patricia Ramires RN [facility] Triage.

## 2019-01-03 ENCOUNTER — TELEPHONE (OUTPATIENT)
Dept: FAMILY MEDICINE | Facility: CLINIC | Age: 69
End: 2019-01-03

## 2019-01-03 DIAGNOSIS — R10.9 FLANK PAIN: Primary | ICD-10-CM

## 2019-01-03 RX ORDER — TRAMADOL HYDROCHLORIDE 50 MG/1
50 TABLET ORAL EVERY 6 HOURS PRN
Qty: 18 TABLET | Refills: 0 | Status: SHIPPED | OUTPATIENT
Start: 2019-01-03 | End: 2019-01-06

## 2019-01-03 NOTE — TELEPHONE ENCOUNTER
Routed to PCP to please advise.    Please see previous message.    Patricia Ramires RN [facility] Triage.

## 2019-01-03 NOTE — TELEPHONE ENCOUNTER
Reason for Call:  Other / Requests call back    Detailed comments: Patient called and stated she is waiting for a call back regarding her previous message.    Phone Number Patient can be reached at: Cell number on file:    Telephone Information:   Mobile [phone]      Best Time: ASAP    Can we leave a detailed message on this number? YES    Call taken on 1/3/2019 at 2:04 PM by Jovita Aquino

## 2019-01-03 NOTE — TELEPHONE ENCOUNTER
Reason for Call:  Medication or medication refill:    Do you use a [facility]?  Name of the pharmacy and phone number for the current request:  [facility]     Name of the medication requested:  The pain medication that Dr Mercedes prescribed diclofenac (VOLTAREN) 75 MG EC tablet-- the patient is stating that this is not helping with the pain.  Patient is needing something different. Please call patient to discuss.    Other request:     Can we leave a detailed message on this number? YES    Phone number patient can be reached at: Other phone number:  [phone]    Best Time: any    Call taken on 1/3/2019 at 11:43 AM by Jolene Ortega

## 2019-01-05 ENCOUNTER — TRANSFERRED RECORDS (OUTPATIENT)
Dept: HEALTH INFORMATION MANAGEMENT | Facility: CLINIC | Age: 69
End: 2019-01-05

## 2019-01-07 ENCOUNTER — TELEPHONE (OUTPATIENT)
Dept: FAMILY MEDICINE | Facility: CLINIC | Age: 69
End: 2019-01-07

## 2019-01-07 NOTE — TELEPHONE ENCOUNTER
Reason for Call:  Other     Detailed comments: Patient said that the medication prescribed for the back pain is causing  Nausea and dizziness and now due to some personal issues she is experiencing anxiety and would like discuss having a script prescribed.     Phone Number Patient can be reached at: Home number on file [phone] (home)    Best Time:     Can we leave a detailed message on this number? Not Applicable    Call taken on 1/7/2019 at 9:57 AM by Bridget Hamilton

## 2019-01-07 NOTE — TELEPHONE ENCOUNTER
Notified patient of the message below per PCP.  Appointment made with Mago Sewell for tomorrow.    Patricia Ramires RN [facility] Triage.

## 2019-01-08 ENCOUNTER — OFFICE VISIT (OUTPATIENT)
Dept: FAMILY MEDICINE | Facility: CLINIC | Age: 69
End: 2019-01-08
Payer: COMMERCIAL

## 2019-01-08 VITALS
WEIGHT: 233 LBS | BODY MASS INDEX: 42.62 KG/M2 | SYSTOLIC BLOOD PRESSURE: 130 MMHG | TEMPERATURE: 96.8 F | DIASTOLIC BLOOD PRESSURE: 78 MMHG | OXYGEN SATURATION: 98 % | HEART RATE: 72 BPM

## 2019-01-08 DIAGNOSIS — R11.0 NAUSEA: Primary | ICD-10-CM

## 2019-01-08 DIAGNOSIS — Z87.442 HISTORY OF RENAL STONE: ICD-10-CM

## 2019-01-08 DIAGNOSIS — E78.5 HYPERLIPIDEMIA LDL GOAL <100: ICD-10-CM

## 2019-01-08 DIAGNOSIS — Z12.11 SPECIAL SCREENING FOR MALIGNANT NEOPLASMS, COLON: ICD-10-CM

## 2019-01-08 DIAGNOSIS — E11.9 TYPE 2 DIABETES MELLITUS WITHOUT COMPLICATION, WITHOUT LONG-TERM CURRENT USE OF INSULIN (H): ICD-10-CM

## 2019-01-08 DIAGNOSIS — I10 HYPERTENSION GOAL BP (BLOOD PRESSURE) < 150/90: ICD-10-CM

## 2019-01-08 DIAGNOSIS — E87.6 HYPOKALEMIA: ICD-10-CM

## 2019-01-08 DIAGNOSIS — M54.6 BILATERAL THORACIC BACK PAIN, UNSPECIFIED CHRONICITY: ICD-10-CM

## 2019-01-08 LAB
ALBUMIN SERPL-MCNC: 3.5 G/DL (ref 3.4–5)
ALBUMIN UR-MCNC: NEGATIVE MG/DL
ALP SERPL-CCNC: 84 U/L (ref 40–150)
ALT SERPL W P-5'-P-CCNC: 42 U/L (ref 0–50)
ANION GAP SERPL CALCULATED.3IONS-SCNC: 10 MMOL/L (ref 3–14)
APPEARANCE UR: CLEAR
AST SERPL W P-5'-P-CCNC: 28 U/L (ref 0–45)
BILIRUB SERPL-MCNC: 1.6 MG/DL (ref 0.2–1.3)
BILIRUB UR QL STRIP: NEGATIVE
BUN SERPL-MCNC: 7 MG/DL (ref 7–30)
CALCIUM SERPL-MCNC: 9.3 MG/DL (ref 8.5–10.1)
CHLORIDE SERPL-SCNC: 100 MMOL/L (ref 94–109)
CO2 SERPL-SCNC: 25 MMOL/L (ref 20–32)
COLOR UR AUTO: YELLOW
CREAT SERPL-MCNC: 0.99 MG/DL (ref 0.52–1.04)
GFR SERPL CREATININE-BSD FRML MDRD: 58 ML/MIN/{1.73_M2}
GLUCOSE SERPL-MCNC: 155 MG/DL (ref 70–99)
GLUCOSE UR STRIP-MCNC: NEGATIVE MG/DL
HBA1C MFR BLD: 6.6 % (ref 0–5.6)
HGB UR QL STRIP: NEGATIVE
KETONES UR STRIP-MCNC: ABNORMAL MG/DL
LEUKOCYTE ESTERASE UR QL STRIP: NEGATIVE
NITRATE UR QL: NEGATIVE
NON-SQ EPI CELLS #/AREA URNS LPF: ABNORMAL /LPF
PH UR STRIP: 6.5 PH (ref 5–7)
POTASSIUM SERPL-SCNC: 3.6 MMOL/L (ref 3.4–5.3)
PROT SERPL-MCNC: 7.2 G/DL (ref 6.8–8.8)
RBC #/AREA URNS AUTO: ABNORMAL /HPF
SODIUM SERPL-SCNC: 135 MMOL/L (ref 133–144)
SOURCE: ABNORMAL
SP GR UR STRIP: <=1.005 (ref 1–1.03)
UROBILINOGEN UR STRIP-ACNC: 0.2 EU/DL (ref 0.2–1)
WBC #/AREA URNS AUTO: ABNORMAL /HPF

## 2019-01-08 PROCEDURE — 83036 HEMOGLOBIN GLYCOSYLATED A1C: CPT | Performed by: FAMILY MEDICINE

## 2019-01-08 PROCEDURE — 99214 OFFICE O/P EST MOD 30 MIN: CPT | Performed by: FAMILY MEDICINE

## 2019-01-08 PROCEDURE — 36415 COLL VENOUS BLD VENIPUNCTURE: CPT | Performed by: FAMILY MEDICINE

## 2019-01-08 PROCEDURE — 81001 URINALYSIS AUTO W/SCOPE: CPT | Performed by: FAMILY MEDICINE

## 2019-01-08 PROCEDURE — 80053 COMPREHEN METABOLIC PANEL: CPT | Performed by: FAMILY MEDICINE

## 2019-01-08 RX ORDER — MELOXICAM 7.5 MG/1
7.5 TABLET ORAL DAILY
Qty: 30 TABLET | Refills: 3 | Status: SHIPPED | OUTPATIENT
Start: 2019-01-08 | End: 2019-01-11

## 2019-01-08 RX ORDER — ONDANSETRON 8 MG/1
8 TABLET, FILM COATED ORAL EVERY 8 HOURS PRN
Qty: 20 TABLET | Refills: 1 | Status: SHIPPED | OUTPATIENT
Start: 2019-01-08 | End: 2019-01-15

## 2019-01-08 ASSESSMENT — PAIN SCALES - GENERAL: PAINLEVEL: SEVERE PAIN (7)

## 2019-01-08 NOTE — PROGRESS NOTES
SUBJECTIVE:                                                    Deanna Butler is a 68 year old female who presents to clinic today for the following health issues:  Patient comes in today with a few concerns.  She reports she has been having upper back pain, nausea feeling fatigued, tired, no energy.  She denies any fevers chills or night sweats.  Patient was seen in the ER on January 5, 2019.  With similar symptoms, r she had x-rays and lab results.  She did show she has 1 mm stone in the right kidneys with no obstruction.  Patient also found to have a low potassium.  She is on hydrochlorothiazide for her blood pressure.  She denies any frequency, urgency denies any pain with urination denies any blood in her urine or stool.    She reports she has been she has been working to lose weight and she has lost  weight, denies any changes in her bowel habit has no blood in her stool.  She is no fever no chills.  She had her gallbladder removed.  She does report she is under a lot of stress, from her landlord was to come  and inspect the property.    Patient denies any change in her bowel habits she has no blood in her stool she never had a colonoscopy.    She denies any fever, chills, night sweats.    She reports she stopped taking the medication were given to her for her kidney stone, she was put on diclofenac, tramadol she reports she stopped taking them because of the nausea.  ED/UC Followup:    Facility:  [facility]  Date of visit: 01/05/2019  Reason for visit: Back pain and anxiety   Current Status: Unchanged         Patient stated that she is nausea and feels like throwing up. Back pain still have not changed.    Problem list and histories reviewed & adjusted, as indicated.  Additional history: as documented    Patient Active Problem List   Diagnosis     CAD (coronary artery disease)     Hyperlipidemia LDL goal <100     CKD (chronic kidney disease) stage 3, GFR 30-59 ml/min (H)     Type 2 diabetes,  HbA1C goal < 8% (H)     Venous stasis dermatitis of both lower extremities     Morbid obesity (H)     Hypertension goal BP (blood pressure) < 150/90     Past Surgical History:   Procedure Laterality Date     STENT Bilateral 2011    RCA disease, S/P stent       Social History     Tobacco Use     Smoking status: Former Smoker     Last attempt to quit: 1980     Years since quittin.3     Smokeless tobacco: Never Used   Substance Use Topics     Alcohol use: No     Comment: occasional wine     Family History   Problem Relation Age of Onset     Hypertension Mother      Arthritis Mother      Cancer Father      Cancer Brother      Heart Disease Sister          Current Outpatient Medications   Medication Sig Dispense Refill     ACE/ARB/ARNI NOT PRESCRIBED, INTENTIONAL, Please choose reason not prescribed, below       ammonium lactate (LAC-HYDRIN) 12 % cream Apply topically 2 times daily as needed for dry skin 385 g 3     ASPIRIN PO Take 81 mg by mouth       carvedilol ER (COREG CR) 20 MG 24 hr capsule TAKE ONE CAPSULE BY MOUTH EVERY DAY 90 capsule 1     cholecalciferol (VITAMIN D) 400 UNIT TABS Take 400 Units by mouth daily.       fish oil-omega-3 fatty acids (FISH OIL) 1000 MG capsule Take 2 g by mouth daily.       hydrochlorothiazide (HYDRODIURIL) 12.5 MG tablet TAKE ONE TABLET BY MOUTH EVERY DAY 90 tablet 1     meloxicam (MOBIC) 7.5 MG tablet Take 1 tablet (7.5 mg) by mouth daily 30 tablet 3     nitroGLYcerin (NITROSTAT) 0.4 MG sublingual tablet Place 1 tablet (0.4 mg) under the tongue every 5 minutes as needed 25 tablet 0     ondansetron (ZOFRAN) 8 MG tablet Take 1 tablet (8 mg) by mouth every 8 hours as needed for nausea 20 tablet 1     order for DME Equipment being ordered: BP machine. 1 Device 0     STATIN NOT PRESCRIBED, INTENTIONAL, Please choose reason not prescribed, below       Allergies   Allergen Reactions     Lisinopril      Possible rash     Metformin      Possible rash     Recent Labs   Lab  Test 01/08/19  0845 08/29/18  0952 10/13/17  1055 07/19/12  1031   A1C 6.6* 6.5* 6.3* 6.8*   LDL  --  118* 131* 72   HDL  --  50 50 47*   TRIG  --  194* 227* 179*   ALT  --   --   --  43   CR  --   --  1.06* 0.94   GFRESTIMATED  --   --  51* 60*   GFRESTBLACK  --   --  62 73   POTASSIUM  --   --  4.1 4.4   TSH  --   --  2.08 1.71        ROS:  Constitutional, HEENT, cardiovascular, pulmonary, gi and gu systems are negative, except as otherwise noted.    OBJECTIVE:     /78   Pulse 72   Temp 96.8  F (36  C) (Oral)   Wt 105.7 kg (233 lb)   SpO2 98%   Breastfeeding? No   BMI 42.62 kg/m    Body mass index is 42.62 kg/m .  GENERAL: healthy, alert and no distress  NECK: no adenopathy, no asymmetry, masses, or scars and thyroid normal to palpation  RESP: lungs clear to auscultation - no rales, rhonchi or wheezes  CV: regular rate and rhythm, normal S1 S2, no S3 or S4, no murmur, click or rub, no peripheral edema and peripheral pulses strong  ABDOMEN: soft, nontender, no hepatosplenomegaly, no masses and bowel sounds normal  MS: no gross musculoskeletal defects noted, no edema    Diagnostic Test Results:  See order in Epic    ASSESSMENT/PLAN:       ICD-10-CM    1. Nausea R11.0 Comprehensive metabolic panel     UA with Microscopic reflex to Culture     ondansetron (ZOFRAN) 8 MG tablet     meloxicam (MOBIC) 7.5 MG tablet     **A1C FUTURE 1yr   2. History of renal stone Z87.442 Comprehensive metabolic panel     UA with Microscopic reflex to Culture     ondansetron (ZOFRAN) 8 MG tablet     meloxicam (MOBIC) 7.5 MG tablet   3. Special screening for malignant neoplasms, colon Z12.11 Fecal colorectal cancer screen (FIT)   4. Bilateral thoracic back pain, unspecified chronicity M54.6 meloxicam (MOBIC) 7.5 MG tablet   5. Hyperlipidemia LDL goal <100 E78.5    6. Hypertension goal BP (blood pressure) < 150/90 I10 Comprehensive metabolic panel   7. Hypokalemia E87.6 Comprehensive metabolic panel   #1 patient continued to have  nausea stomach upset.  Could be related to medication she reports she has been taking Tylenol a thousand milligrams every 4-6 hours.  In addition, she was taking tramadol in the past.  Advised to cut down her Tylenol and take it with food.  She was advised she could take 1000 mg every 8 hours up to 3 g per 24 hours.    In addition, I would repeat her UA, I will obtain a CMP.    2.  History of hypertension.  She is on hydrochlorothiazide, Coreg she did have a low potassium.  I will repeat her CMP today.     3.  Patient concern of being fatigued tired, she reports under stress.  She never had a colonoscopy.  I will have a bit test.    4.  History of upper, mid thoracic back pain I gave her a prescription for meloxicam 7.5 mg 1 tablet daily with food.  She also was advised to continue with her Tylenol thousand milligrams up to 3 g per 24 hours.    She was advised if symptoms worsening or sees no improvement to follow-up in the clinic, go to the ER.  There are no Patient Instructions on file for this visit.    Pierre Mercedes MD  [facility]

## 2019-01-09 ENCOUNTER — TELEPHONE (OUTPATIENT)
Dept: FAMILY MEDICINE | Facility: CLINIC | Age: 69
End: 2019-01-09

## 2019-01-09 RX ORDER — GLIPIZIDE 2.5 MG/1
2.5 TABLET, EXTENDED RELEASE ORAL DAILY
Qty: 90 TABLET | Refills: 3 | Status: SHIPPED | OUTPATIENT
Start: 2019-01-09 | End: 2019-07-29

## 2019-01-09 NOTE — TELEPHONE ENCOUNTER
Called and advised patient of the orders below per Dr. Mercedes.    She is wondering when to recheck her A1c after starting the medication.    She is also wondering if there is anything cheaper she can take besides Carvedilol.  She stated that it is $90.00 every 3 months.    Routed to provider to please advise.  Patricia Ramires RN [facility] Triage.

## 2019-01-09 NOTE — TELEPHONE ENCOUNTER
Please inform pt. Of her lab result  Normal for Sodium and Kidney  A1c elevated at 6.6: she is Diabetic intolerance.  Advise with diet and weight loss  Started low dose of Glipzide xl at 2.5 mg, daily after food ( with largest meal of day )  Sent medication to pharm.  Urine looks clean, no sign of infections   Follow up with PCP as needed

## 2019-01-09 NOTE — TELEPHONE ENCOUNTER
Will mail out results when we call patient back.  See previous triage message to provider.  Patricia Ramires RN [facility] Triage.

## 2019-01-09 NOTE — TELEPHONE ENCOUNTER
Patient called and requested that a copy of her lab results from 1/8/19 be mailed to her home address. Verified that home address is current. Patient also asked if Dr. Engelmann & Dr. Mercedes have business cards they can mail with the results. Please call if any questions.    Thank you  Karla Keita  Patient Representative

## 2019-01-09 NOTE — TELEPHONE ENCOUNTER
Reason for Call:  Other call back    Detailed comments: Patient is calling back for her results, and she is also wondering if she can take a second dose of her meloxicam. She states she took her first dose at 5am, and it has been very helpful, but she is having more pain now. Please call to advise    Phone Number Patient can be reached at: Home number on file [phone] (home)    Best Time: asap    Can we leave a detailed message on this number? YES    Call taken on 1/9/2019 at 12:44 PM by Gus Benedict

## 2019-01-09 NOTE — TELEPHONE ENCOUNTER
Reason for Call:  Other     Detailed comments:  Patient is calling to get her results.  Please call to discuss the results.    Phone Number Patient can be reached at: Cell number on file:    Telephone Information:   Mobile [phone]       Best Time: any    Can we leave a detailed message on this number? YES    Call taken on 1/9/2019 at 9:53 AM by Jolene Ortega

## 2019-01-10 ENCOUNTER — TELEPHONE (OUTPATIENT)
Dept: FAMILY MEDICINE | Facility: CLINIC | Age: 69
End: 2019-01-10

## 2019-01-10 DIAGNOSIS — M54.6 BILATERAL THORACIC BACK PAIN, UNSPECIFIED CHRONICITY: ICD-10-CM

## 2019-01-10 DIAGNOSIS — Z87.442 HISTORY OF RENAL STONE: ICD-10-CM

## 2019-01-10 DIAGNOSIS — R11.0 NAUSEA: ICD-10-CM

## 2019-01-10 NOTE — TELEPHONE ENCOUNTER
Reason for Call:  Other / Additional pain med request    Detailed comments: Patient called and stated that she saw Dr Mercedes and he prescribed pain medication (Meloxicam 7 1/2 MG, and although it is working, she feels that she needs something else to work with it to totally knock out the pain. Patient also stated that she does not want any of the pain medication that was prescribed to her before because they make her very dizzy.  Please call patient back to discuss.    Phone Number Patient can be reached at: Home number on file [phone] (home)    Best Time: Anytime    Can we leave a detailed message on this number? YES    Call taken on 1/10/2019 at 2:10 PM by Jovita Aquino

## 2019-01-11 RX ORDER — MELOXICAM 7.5 MG/1
TABLET ORAL
Qty: 60 TABLET | Refills: 6 | Status: SHIPPED | OUTPATIENT
Start: 2019-01-11 | End: 2019-07-29

## 2019-01-11 NOTE — TELEPHONE ENCOUNTER
Reason for Call:  Other call back    Detailed comments: Patient calling to check on status of request from yesterday and also would like a nurse to call, please advise.     Phone Number Patient can be reached at: Home number on file [phone] (home)    Best Time: Anytime    Can we leave a detailed message on this number? YES    Call taken on 1/11/2019 at 8:35 AM by Madonna Chandra

## 2019-01-11 NOTE — TELEPHONE ENCOUNTER
Pierre Mercedes MD   Cp Team 2 3 minutes ago (9:54 AM)      She may increase med. to  1-2 tab daily with food   thanks (Routing comment)

## 2019-01-11 NOTE — TELEPHONE ENCOUNTER
Notified patient of the message below per provider.  Patient stated understanding and agreeable with the plan of care. Patricia Ramires, RN-BSN, [facility] Triage.

## 2019-01-17 ENCOUNTER — TELEPHONE (OUTPATIENT)
Dept: FAMILY MEDICINE | Facility: CLINIC | Age: 69
End: 2019-01-17

## 2019-01-24 ENCOUNTER — TELEPHONE (OUTPATIENT)
Dept: FAMILY MEDICINE | Facility: CLINIC | Age: 69
End: 2019-01-24

## 2019-01-24 NOTE — TELEPHONE ENCOUNTER
Called the patient and relayed the message below. She is requesting Dr BERNAL's note below to be mailed to her for reassurance that. She is also requesting her lab results from her visit with Dr Mercedes to be mail with this as well. See other TE.     Miladis Rothman RN

## 2019-01-24 NOTE — TELEPHONE ENCOUNTER
"Called patient and she stated she researched the medication (zoloft) and read that it can cause vision problems and now she is scared to start this medication due to the side effects. She stated \"these are the only eyes I get and I can't cash in anymore parts if this medication messes them up\". She would like to know if the provider really wants this medication prescribed to her given her age and what he advises about the side effects.  Patient stated she will not start the medication until she hears back.     Routed to provider.     Miladis Rothman RN    "

## 2019-01-24 NOTE — TELEPHONE ENCOUNTER
Usually this medication does not cause any side effects   The only time that it can cause an eye problem is if you have angle closure glaucoma.  The eye doctor would have told her about this by now.  It should be safe

## 2019-01-24 NOTE — TELEPHONE ENCOUNTER
Reason for Call:  Other / Medication questions (sertraline (ZOLOFT) 25 MG tablet)    Detailed comments: Patient called and requested to speak to a nurse since before she starts taking a medication she was prescribed, she has some questions to ask and discuss (sertraline (ZOLOFT) 25 MG tablet).  Please call patient back to discuss.    Phone Number Patient can be reached at: Cell number on file:    Telephone Information:   Mobile [phone]     Best Time: ASAP    Can we leave a detailed message on this number? NO    Call taken on 1/24/2019 at 2:50 PM by Jovita Aquino

## 2019-01-29 NOTE — TELEPHONE ENCOUNTER
Called patient at [phone] (home). Left message on voicemail to return phone call to triage line at [phone].  Patricia Ramires RN [facility] Triage.

## 2019-01-29 NOTE — TELEPHONE ENCOUNTER
Patient called and stated she still has not received her lab results in the mail yet. She asked if a nurse could call her to go over results on the phone.    Thank you  Karla Keita  Patient Representative

## 2019-01-30 NOTE — TELEPHONE ENCOUNTER
Patient returned call to triage line.  See other message also.  Patricia Ramires, RADHA [facility] Triage.

## 2019-02-11 ENCOUNTER — TELEPHONE (OUTPATIENT)
Dept: FAMILY MEDICINE | Facility: CLINIC | Age: 69
End: 2019-02-11

## 2019-02-11 NOTE — TELEPHONE ENCOUNTER
Rout[facility] to Dr. Mercedes.    [facility]    Patricia Ramires RN [facility] Triage.

## 2019-02-11 NOTE — TELEPHONE ENCOUNTER
Reason for call:  Patient reporting a symptom    Symptom or request: prevention of kidney stones patient is wondering what to do going forward. Provider Mago recommended medication for back and wants to touch base about that is well.    Duration (how long have symptoms been present): A few days     Have you been treated for this before? Yes    Additional comments: Patient would like to touch base with the care team as soon as possible.    Phone Number patient can be reached at:  Cell number on file:    Telephone Information:   Mobile [phone]       Best Time:  As soon as possible    Can we leave a detailed message on this number:  YES    Call taken on 2/11/2019 at 1:48 PM by Herbert Meyer

## 2019-02-12 NOTE — TELEPHONE ENCOUNTER
To prevent Kidney stones: drink a lot of water, may use Cranberry juice and Lemonade juice  Avoid sodium intake as well  For her upper back pain         She has Mobic, 1-2 tab daily as needed for pain with food, in addition, tylenol  thanks

## 2019-02-12 NOTE — TELEPHONE ENCOUNTER
Called and spoke to patient and gave her below information.  Patient states understanding and agrees with plan.  She will take Qty 2 tabs of Mobic during the day and Tylenol at night.  If this isn't helping the back pain, she will call back.  Carmen Alvarez RN

## 2019-02-21 DIAGNOSIS — I25.10 CORONARY ARTERY DISEASE INVOLVING NATIVE HEART WITHOUT ANGINA PECTORIS, UNSPECIFIED VESSEL OR LESION TYPE: ICD-10-CM

## 2019-02-21 RX ORDER — ATENOLOL 50 MG/1
50 TABLET ORAL DAILY
Qty: 90 TABLET | Refills: 0 | Status: SHIPPED | OUTPATIENT
Start: 2019-02-21 | End: 2019-04-15

## 2019-02-21 NOTE — TELEPHONE ENCOUNTER
Prescription adjusted from 30 tablets with 3 refills to 90 tablets with no refills.     Miladis Rothman RN

## 2019-02-21 NOTE — TELEPHONE ENCOUNTER
Pt wants a 90 day supply please!  Thanks  Marci Ortiz CPhT  [facility]  [phone]

## 2019-03-13 ENCOUNTER — TELEPHONE (OUTPATIENT)
Dept: FAMILY MEDICINE | Facility: CLINIC | Age: 69
End: 2019-03-13

## 2019-03-13 NOTE — TELEPHONE ENCOUNTER
Reason for Call:  Other Update    Detailed comments: Deanna is wanting to call and update Dr. Mercedes about her being placed on blood sugar medication. Pt has stated that it has been going great; she has lost about 90 lbs. Deanna had also said that her blood pressure is doing good also, she had taken it this morning and it was 119/61. Please call pt with any questions.     Phone Number Patient can be reached at: Home number on file [phone] (home)    Best Time: Anytime    Can we leave a detailed message on this number? YES    Call taken on 3/13/2019 at 10:51 AM by Vidya Glass

## 2019-03-19 NOTE — TELEPHONE ENCOUNTER
Its recommended to stay on ASA  If blood sugar remain above 90- 100, and she has no low blood sugar and has no symptoms if low blood sugar, then I would recommend to stay on it for now.  thanks

## 2019-03-19 NOTE — TELEPHONE ENCOUNTER
Called patient.  She wanted to know if she can discontinue her daily ASA.    Patient also wants to know if she can go off of her Glipizide. She stated that she has lost 90 pounds and is exercising.  Her blood sugars have been 111-134 since 3/11/19.    Routed to provider to advise.  Patricia Ramires RN [facility] Triage.

## 2019-03-19 NOTE — TELEPHONE ENCOUNTER
Patient would like to speak to a nurse about blood sugar medication and  glipiZIDE please call to discuss

## 2019-03-19 NOTE — TELEPHONE ENCOUNTER
Notified patient of the message below per PCP.  Patient stated understanding and agreeable with the plan of care. Patricia Ramires,MARIELLAN,RN, [facility] Triage.

## 2019-03-27 ENCOUNTER — TRANSFERRED RECORDS (OUTPATIENT)
Dept: HEALTH INFORMATION MANAGEMENT | Facility: CLINIC | Age: 69
End: 2019-03-27

## 2019-03-28 ENCOUNTER — TRANSFERRED RECORDS (OUTPATIENT)
Dept: HEALTH INFORMATION MANAGEMENT | Facility: CLINIC | Age: 69
End: 2019-03-28

## 2019-03-28 LAB — EJECTION FRACTION: 63

## 2019-04-01 ENCOUNTER — TELEPHONE (OUTPATIENT)
Dept: FAMILY MEDICINE | Facility: CLINIC | Age: 69
End: 2019-04-01

## 2019-04-01 ENCOUNTER — TRANSFERRED RECORDS (OUTPATIENT)
Dept: HEALTH INFORMATION MANAGEMENT | Facility: CLINIC | Age: 69
End: 2019-04-01

## 2019-04-01 NOTE — TELEPHONE ENCOUNTER
"Patient transferred to my line. Patient informed nurse that her current blood pressure is 175/96 and that she is experiencing chest pain, chest tightness, dizziness, weakness and nausea, and that she overall \"dont feel right\".   Patient advised to call an ambulance and chew one adult aspirin unless she was allergic. Patient asked nurse to tell her  this. Patient put  on the line and nurse repeated the instructions. He stated that there is nothing wrong with her and that she is getting herself worked up because he has to go back to work. Nurse reiterated to the  that based off the symptoms that his wife is experiencing that she be advised to call and ambulance and take and adult Asprin.  stated she was evaluated in the ER previously but it is up to her to go in or not and gave the phone back to the patient. She stated she will take an Aspirin and call the ambulance right away.    Miladis Rothman RN     "

## 2019-04-01 NOTE — TELEPHONE ENCOUNTER
"Red Flag Patient states her blood pressure has been high all morning and \"doesn't feel right\"    Chante Campos  Patient Representative    "

## 2019-04-02 ENCOUNTER — TELEPHONE (OUTPATIENT)
Dept: FAMILY MEDICINE | Facility: CLINIC | Age: 69
End: 2019-04-02

## 2019-04-02 DIAGNOSIS — M54.42 ACUTE LEFT-SIDED LOW BACK PAIN WITH LEFT-SIDED SCIATICA: Primary | ICD-10-CM

## 2019-04-02 RX ORDER — HYDROXYZINE HYDROCHLORIDE 10 MG/1
10 TABLET, FILM COATED ORAL 3 TIMES DAILY PRN
COMMUNITY
Start: 2019-04-02 | End: 2019-04-04 | Stop reason: ALTCHOICE

## 2019-04-02 RX ORDER — CITALOPRAM HYDROBROMIDE 10 MG/1
10 TABLET ORAL DAILY
COMMUNITY
Start: 2019-04-02 | End: 2019-04-04 | Stop reason: ALTCHOICE

## 2019-04-02 RX ORDER — PREDNISONE 20 MG/1
TABLET ORAL
Qty: 10 TABLET | Refills: 0 | Status: SHIPPED | OUTPATIENT
Start: 2019-04-02 | End: 2019-04-04

## 2019-04-02 RX ORDER — CYCLOBENZAPRINE HCL 5 MG
5 TABLET ORAL
Qty: 30 TABLET | Refills: 0 | Status: SHIPPED | OUTPATIENT
Start: 2019-04-02 | End: 2019-04-04

## 2019-04-02 NOTE — TELEPHONE ENCOUNTER
I called patient and advised her of Rx's sent.   Ended up doing med reconcile as patient was telling me about being put on citalopram in the hospital and she was also given hydroxyzine prn, says she is not using this as the citalopram seems to be helping.    She never started the zoloft advised at January virtual visit.  She will call pharmacy to arrange deliver of her meds.    Betty Duran RN  [facility]

## 2019-04-02 NOTE — TELEPHONE ENCOUNTER
Reason for Call:  Prescription Question     Detailed comments: Patient is wanting a call back with question on being prescribed pain medication for pinched nerve.      Phone Number Patient can be reached at: Cell number on file:    Telephone Information:   Mobile [phone]       Best Time: Anytime    Can we leave a detailed message on this number? YES    Call taken on 4/2/2019 at 9:27 AM by Cynthia Vidales

## 2019-04-02 NOTE — TELEPHONE ENCOUNTER
Duplicate issue, see other encounter, prednisone Rx sent by provider.    Betty Duran RN  [facility]

## 2019-04-02 NOTE — TELEPHONE ENCOUNTER
Reviewed pt. ER visit, she has left Sciatica.  I sent 5 days of Prednisone to help with the irration of nerve ( may cause her blood sugar to be elevated , then it will come down )  Flexeril at bedtime  Continue with  Mobic 1-2 tab , with food as needed.  Sent to [facility]  thanks

## 2019-04-02 NOTE — TELEPHONE ENCOUNTER
Routing to PCP to review and advise.    Patient requesting phone appointment/pain medication.  See ED visit 4/1/2019      Margaret Morillo RN  [facility]

## 2019-04-02 NOTE — TELEPHONE ENCOUNTER
Reason for call:  Patient reporting a symptom    Symptom or request: Patient called stating she has a pinched nerve and can't get out of bed    Duration (how long have symptoms been present):     Have you been treated for this before?     Additional comments: Patient would like to request a phone visit or have the PCP prescribe pain meds.    Phone Number patient can be reached at:  Home number on file [phone] (home)    Best Time:      Can we leave a detailed message on this number:  YES    Call taken on 4/2/2019 at 7:13 AM by Nicci Hood

## 2019-04-04 ENCOUNTER — OFFICE VISIT (OUTPATIENT)
Dept: FAMILY MEDICINE | Facility: CLINIC | Age: 69
End: 2019-04-04
Payer: COMMERCIAL

## 2019-04-04 VITALS
HEART RATE: 66 BPM | BODY MASS INDEX: 36.14 KG/M2 | HEIGHT: 63 IN | OXYGEN SATURATION: 96 % | TEMPERATURE: 97 F | WEIGHT: 204 LBS

## 2019-04-04 DIAGNOSIS — F41.1 GAD (GENERALIZED ANXIETY DISORDER): Primary | ICD-10-CM

## 2019-04-04 DIAGNOSIS — M54.42 ACUTE LEFT-SIDED LOW BACK PAIN WITH LEFT-SIDED SCIATICA: ICD-10-CM

## 2019-04-04 PROCEDURE — 99214 OFFICE O/P EST MOD 30 MIN: CPT | Performed by: FAMILY MEDICINE

## 2019-04-04 RX ORDER — CYCLOBENZAPRINE HCL 5 MG
5 TABLET ORAL
Qty: 30 TABLET | Refills: 1 | Status: SHIPPED | OUTPATIENT
Start: 2019-04-04 | End: 2019-07-29

## 2019-04-04 RX ORDER — BUSPIRONE HYDROCHLORIDE 10 MG/1
10 TABLET ORAL 2 TIMES DAILY
Qty: 60 TABLET | Refills: 1 | Status: SHIPPED | OUTPATIENT
Start: 2019-04-04 | End: 2019-04-29 | Stop reason: ALTCHOICE

## 2019-04-04 ASSESSMENT — MIFFLIN-ST. JEOR: SCORE: 1414.47

## 2019-04-04 NOTE — PATIENT INSTRUCTIONS
Take mobic in AM , 1-2 tab daily with breakfast as neded  Flexeril at night as needed  Start Buspar one tab 2x a day.  Follow up in 4- 6 weeks or sooner.

## 2019-04-04 NOTE — PROGRESS NOTES
SUBJECTIVE:                                                    Deanna Butler is a 69 year old female who presents to clinic today for the following health issues:  Patient comes in today with concern of Anxiety, and depression.  She reports she has been having a lot of agitation.  She reports she is been on Celexa 10 mg is not helping.  In addition, she was on Atarax however she quit taking because of the side effect.  She denies any suicidal thoughts or ideation.  She reports her  is taken a few weeks off, to  stay with her and that should help. ( need FMLA forms filled )    Recently, she was treated for left sciatic neuropathy she reported has been getting better she continued to take prednisone and Flexeril at bedtime.  In addition, she is taking meloxicam as needed.    She reports blood sugars been well controlled she is been averaging 120s to 130s  in the morning she is continued to be on glipizide, 2.5 mg, one tab daily.  Has no low blood sugar.    Problem list and histories reviewed & adjusted, as indicated.  Additional history: as documented    Patient Active Problem List   Diagnosis     CAD (coronary artery disease)     Hyperlipidemia LDL goal <100     CKD (chronic kidney disease) stage 3, GFR 30-59 ml/min (H)     Type 2 diabetes, HbA1C goal < 8% (H)     Venous stasis dermatitis of both lower extremities     Morbid obesity (H)     Hypertension goal BP (blood pressure) < 150/90     SHANON (generalized anxiety disorder)     Past Surgical History:   Procedure Laterality Date     STENT Bilateral 2011    RCA disease, S/P stent       Social History     Tobacco Use     Smoking status: Former Smoker     Last attempt to quit: 1980     Years since quittin.5     Smokeless tobacco: Never Used   Substance Use Topics     Alcohol use: No     Comment: occasional wine     Family History   Problem Relation Age of Onset     Hypertension Mother      Arthritis Mother      Cancer Father      Cancer  "Brother      Heart Disease Sister          Current Outpatient Medications   Medication Sig Dispense Refill     ACE/ARB/ARNI NOT PRESCRIBED, INTENTIONAL, Please choose reason not prescribed, below       ASPIRIN PO Take 81 mg by mouth       atenolol (TENORMIN) 50 MG tablet Take 1 tablet (50 mg) by mouth daily 90 tablet 0     busPIRone (BUSPAR) 10 MG tablet Take 1 tablet (10 mg) by mouth 2 times daily 60 tablet 1     cholecalciferol (VITAMIN D3) 400 unit (10 mcg) TABS tablet Take 1 tablet (400 Units) by mouth twice a week       cyclobenzaprine (FLEXERIL) 5 MG tablet Take 1 tablet (5 mg) by mouth nightly as needed for muscle spasms 30 tablet 1     fish oil-omega-3 fatty acids (FISH OIL) 1000 MG capsule Take 2 g by mouth daily.       glipiZIDE (GLUCOTROL XL) 2.5 MG 24 hr tablet Take 1 tablet (2.5 mg) by mouth daily 90 tablet 3     hydrochlorothiazide (HYDRODIURIL) 12.5 MG tablet TAKE ONE TABLET BY MOUTH EVERY DAY 90 tablet 1     meloxicam (MOBIC) 7.5 MG tablet 1-2 tab daily with food ( dose increased ) 60 tablet 6     nitroGLYcerin (NITROSTAT) 0.4 MG sublingual tablet Place 1 tablet (0.4 mg) under the tongue every 5 minutes as needed 25 tablet 0     order for DME Equipment being ordered: BP machine. 1 Device 0     STATIN NOT PRESCRIBED, INTENTIONAL, Please choose reason not prescribed, below       Allergies   Allergen Reactions     Lisinopril      Possible rash     Metformin      Possible rash       ROS:  Constitutional, HEENT, cardiovascular, pulmonary, gi and gu systems are negative, except as otherwise noted.    OBJECTIVE:     Pulse 66   Temp 97  F (36.1  C) (Oral)   Ht 1.6 m (5' 3\")   Wt 92.5 kg (204 lb)   SpO2 96%   BMI 36.14 kg/m    Body mass index is 36.14 kg/m .  GENERAL: healthy, alert and no distress  MS: no gross musculoskeletal defects noted, no edema  NEURO: Normal strength and tone, mentation intact and speech normal  PSYCH: mentation appears normal, affect normal/bright    Diagnostic Test " Results:  none     ASSESSMENT/PLAN:       ICD-10-CM    1. SHANON (generalized anxiety disorder) F41.1    2. Acute left-sided low back pain with left-sided sciatica M54.42 busPIRone (BUSPAR) 10 MG tablet     cyclobenzaprine (FLEXERIL) 5 MG tablet     Generalized anxiety disorder, She reports Celexa and Atarax is not working.  I did stop both of these medication.  She was started on BuSpar 10 mg 1 tablet twice daily.  Discussed with her possible side effect of the medication.    She reports her  will stay with him for the next 3 weeks I will fill his FMLA form.    History of left-sided sciatica which been improving she was advised to continue with home exercise I did renew her Flexeril 5 mg 1 tablet at bedtime as needed she is to continue with meloxicam.  In addition continue with prednisone until completely finished.    History of diabetes which been well controlled she was advised to continue with her glipizide 1 tablet daily and continue with dieting and weight loss.  She was advised to follow-up in 4-6 weeks or sooner  Patient Instructions   Take mobic in AM , 1-2 tab daily with breakfast as neded  Flexeril at night as needed  Start Buspar one tab 2x a day.  Follow up in 4- 6 weeks or sooner.      Pierre Mercedes MD  [facility]

## 2019-04-15 DIAGNOSIS — I25.10 CORONARY ARTERY DISEASE INVOLVING NATIVE HEART WITHOUT ANGINA PECTORIS, UNSPECIFIED VESSEL OR LESION TYPE: ICD-10-CM

## 2019-04-15 RX ORDER — ATENOLOL 50 MG/1
50 TABLET ORAL DAILY
Qty: 90 TABLET | Refills: 0 | Status: SHIPPED | OUTPATIENT
Start: 2019-04-15 | End: 2019-05-22

## 2019-04-15 NOTE — TELEPHONE ENCOUNTER
"Requested Prescriptions   Pending Prescriptions Disp Refills     atenolol (TENORMIN) 50 MG tablet 90 tablet 0     Sig: Take 1 tablet (50 mg) by mouth daily   Last Written Prescription Date:  2-21-19  Last Fill Quantity: 90,  # refills: 0   Last office visit: 4/4/2019 with prescribing provider:  4-4-19   Future Office Visit:        Beta-Blockers Protocol Passed - 4/15/2019  9:48 AM        Passed - Blood pressure under 140/90 in past 12 months     BP Readings from Last 3 Encounters:   01/08/19 130/78   08/29/18 148/74   11/15/17 145/83                 Passed - Patient is age 6 or older        Passed - Recent (12 mo) or future (30 days) visit within the authorizing provider's specialty     Patient had office visit in the last 12 months or has a visit in the next 30 days with authorizing provider or within the authorizing provider's specialty.  See \"Patient Info\" tab in inbasket, or \"Choose Columns\" in Meds & Orders section of the refill encounter.              Passed - Medication is active on med list          "

## 2019-04-15 NOTE — TELEPHONE ENCOUNTER
Patient is due for follow up 5/2/19 per plan at 4/4/19 visit.    See note from pharmacy, patient wants to have a full 90 day refill available for when she gets insurance.    Routed to provider to address this prior to follow up.    Betty Duran RN  [facility]

## 2019-04-15 NOTE — TELEPHONE ENCOUNTER
Pt would like to have refills on hand when she can get through her insurance. (90 day supply)  Thanks!  Marci Ortiz [facility]  [phone]

## 2019-04-29 ENCOUNTER — TELEPHONE (OUTPATIENT)
Dept: FAMILY MEDICINE | Facility: CLINIC | Age: 69
End: 2019-04-29

## 2019-04-29 DIAGNOSIS — F41.1 GAD (GENERALIZED ANXIETY DISORDER): Primary | ICD-10-CM

## 2019-04-29 RX ORDER — BUSPIRONE HYDROCHLORIDE 10 MG/1
10 TABLET ORAL 2 TIMES DAILY
Qty: 30 TABLET | Refills: 1 | OUTPATIENT
Start: 2019-04-29 | End: 2024-08-08

## 2019-04-29 RX ORDER — ESCITALOPRAM OXALATE 5 MG/1
5 TABLET ORAL DAILY
Qty: 90 TABLET | Refills: 1 | Status: SHIPPED | OUTPATIENT
Start: 2019-04-29 | End: 2019-07-29

## 2019-04-29 NOTE — TELEPHONE ENCOUNTER
Called patient at numbers below.  Left messages on voicemail to return phone call to triage line at [phone].  Patricia Ramires RN [facility] Triage.

## 2019-04-29 NOTE — TELEPHONE ENCOUNTER
Reason for Call:  Other prescription    Detailed comments: Patient said that she has been taking Buspar for a month and she is experiencing dizziness, nervous/excited and upset stomach. Is there another medication that she can take that does not have this many symptoms and also not the side effects of diarrhea. [facility] pharmacy      Phone Number Patient can be reached at: Home number on file [phone] (home)    Best Time:     Can we leave a detailed message on this number? NO    Call taken on 4/29/2019 at 10:12 AM by Bridget Hamilton

## 2019-04-29 NOTE — TELEPHONE ENCOUNTER
I sent a new rx of Lexapro  Discontinue Buspar  Started Lexapro at 5 mg daily  May need to take for a few weeks, before notice any benefit  Follow up in 4- 6 weeks, or sooner  thanks

## 2019-04-29 NOTE — TELEPHONE ENCOUNTER
Patient called triage line and nurse picked up. She stated she wants to continue with the busbar versus the lexapro. She reports that the side effects are already starting to get better. She stated she is getting anxious about her  returning to work this week but feels the medication is helping her through this.   Medication and pharmacy cued.     Miladis Rothman RN

## 2019-04-29 NOTE — TELEPHONE ENCOUNTER
Patient states she monroy not want Lexapro, she rather stay on Buspar, but maybe a higher dose, she states she read that Lexapro has way too many medications that she does not want to have. She states her pharmacist states current side effects from Buspar will get better as her body gets used to the medication. Patient states she has some questions that she would like to speak to Nurse in regards to the medications. Patient can be reached at [phone] or [phone].    Thank you,  Mary FRANKLIN    [facility]

## 2019-04-30 DIAGNOSIS — M54.42 ACUTE LEFT-SIDED LOW BACK PAIN WITH LEFT-SIDED SCIATICA: ICD-10-CM

## 2019-04-30 RX ORDER — BUSPIRONE HYDROCHLORIDE 10 MG/1
10 TABLET ORAL 2 TIMES DAILY
Qty: 60 TABLET | Refills: 1 | Status: SHIPPED | OUTPATIENT
Start: 2019-04-30 | End: 2019-05-28

## 2019-04-30 NOTE — TELEPHONE ENCOUNTER
Pt is requesting Buspirone 10 mg, it is no longer listed as a medication she is taking.  She refused the Lexapro because of possible side effects.    Thanks!  Marci Ortiz, Mariele  [facility]  [phone]

## 2019-05-22 DIAGNOSIS — I25.10 CORONARY ARTERY DISEASE INVOLVING NATIVE HEART WITHOUT ANGINA PECTORIS, UNSPECIFIED VESSEL OR LESION TYPE: ICD-10-CM

## 2019-05-22 RX ORDER — ATENOLOL 50 MG/1
50 TABLET ORAL DAILY
Qty: 90 TABLET | Refills: 0 | Status: SHIPPED | OUTPATIENT
Start: 2019-05-22 | End: 2019-07-11

## 2019-05-22 RX ORDER — HYDROCHLOROTHIAZIDE 12.5 MG/1
12.5 TABLET ORAL DAILY
Qty: 90 TABLET | Refills: 1 | Status: SHIPPED | OUTPATIENT
Start: 2019-05-22 | End: 2019-07-29

## 2019-05-22 NOTE — TELEPHONE ENCOUNTER
Prescription approved per [facility] Refill Protocol.  Carmen Alvarez RN

## 2019-05-22 NOTE — TELEPHONE ENCOUNTER
"Requested Prescriptions   Pending Prescriptions Disp Refills     hydrochlorothiazide (HYDRODIURIL) 12.5 MG tablet 90 tablet 1     Sig: Take 1 tablet (12.5 mg) by mouth daily   Last Written Prescription Date:  12-28-18  Last Fill Quantity: 90,  # refills: 1   Last office visit: 4/4/2019 with prescribing provider:  4-4-19   Future Office Visit:   Next 5 appointments (look out 90 days)    Barrie 10, 2019 11:00 AM CDT  [facility] [address] [place] [id number] [phone]             Diuretics (Including Combos) Protocol Passed - 5/22/2019 12:24 PM        Passed - Blood pressure under 140/90 in past 12 months     BP Readings from Last 3 Encounters:   01/08/19 130/78   08/29/18 148/74   11/15/17 145/83                 Passed - Recent (12 mo) or future (30 days) visit within the authorizing provider's specialty     Patient had office visit in the last 12 months or has a visit in the next 30 days with authorizing provider or within the authorizing provider's specialty.  See \"Patient Info\" tab in inbasket, or \"Choose Columns\" in Meds & Orders section of the refill encounter.              Passed - Medication is active on med list        Passed - Patient is age 18 or older        Passed - No active pregancy on record        Passed - Normal serum creatinine on file in past 12 months     Recent Labs   Lab Test 01/08/19  0845   CR 0.99              Passed - Normal serum potassium on file in past 12 months     Recent Labs   Lab Test 01/08/19  0845   POTASSIUM 3.6                    Passed - Normal serum sodium on file in past 12 months     Recent Labs   Lab Test 01/08/19  0845                 Passed - No positive pregnancy test in past 12 months        atenolol (TENORMIN) 50 MG tablet 90 tablet 0     Sig: Take 1 tablet (50 mg) by mouth daily   Last Written Prescription Date:  4-15-19  Last Fill " "Quantity: 90,  # refills: 0   Last office visit: 4/4/2019 with prescribing provider:     Future Office Visit:   Next 5 appointments (look out 90 days)    Barrie 10, 2019 11:00 AM CDT  Telephone Visit with Pierre Mercedes MD  [facility]) [address] [place] [id number]  [phone]             Beta-Blockers Protocol Passed - 5/22/2019 12:24 PM        Passed - Blood pressure under 140/90 in past 12 months     BP Readings from Last 3 Encounters:   01/08/19 130/78   08/29/18 148/74   11/15/17 145/83                 Passed - Patient is age 6 or older        Passed - Recent (12 mo) or future (30 days) visit within the authorizing provider's specialty     Patient had office visit in the last 12 months or has a visit in the next 30 days with authorizing provider or within the authorizing provider's specialty.  See \"Patient Info\" tab in inbasket, or \"Choose Columns\" in Meds & Orders section of the refill encounter.              Passed - Medication is active on med list          "

## 2019-05-25 ENCOUNTER — NURSE TRIAGE (OUTPATIENT)
Dept: NURSING | Facility: CLINIC | Age: 69
End: 2019-05-25

## 2019-05-25 NOTE — TELEPHONE ENCOUNTER
"Caller reports new onset of mild dizziness for  2 days ; wants to stake less of her Buspar or stop her Glipizide   Caller is very pleasantly agitated; talking very fast and needing redirection to complete triage  Caller states she noted  Dizziness \"like after you have a glass or two of wine\" (denies  Etoh) yesterday afternoon and again this morning after taking medications;denies any disability from dizziness and has checked her  BP and bG and they are unchanged  Caller wants to attribute this to  various medications and  stop them or take less   Triage protocol  completed;   Caller is advised to be seen in clinic early next week for evaluation of  New onset dizziness  Is instructed to not  Change medication routine until seen but can stop her OTC vitamin and is to  Decrease amount of caffeine  Caller is instructed to call if dizziness is persisting, worsening or  She develops any other new or worsening symptoms   Caller understands at end of triage   Call transferred to    Vero Pizano RN  [facility]         Reason for Disposition    [1] MILD dizziness (e.g., walking normally) AND [2] has NOT been evaluated by physician for this  (Exception: dizziness caused by heat exposure, sudden standing, or poor fluid intake)    Additional Information    Negative: Severe difficulty breathing (e.g., struggling for each breath, speaks in single words)    Negative: [1] Difficulty breathing or swallowing AND [2] started suddenly after medicine, an allergic food or bee sting    Negative: Shock suspected (e.g., cold/pale/clammy skin, too weak to stand, low BP, rapid pulse)    Negative: Difficult to awaken or acting confused (e.g., disoriented, slurred speech)    Negative: [1] Weakness (i.e., paralysis, loss of muscle strength) of the face, arm or leg on one side of the body AND [2] sudden onset AND [3] present now    Negative: [1] Numbness (i.e., loss of sensation) of the face, arm or leg on one side of the body AND [2] sudden " "onset AND [3] present now    Negative: [1] Loss of speech or garbled speech AND [2] sudden onset AND [3] present now    Negative: Overdose (accidental or intentional) of medications    Negative: [1] Fainted > 15 minutes ago AND [2] still feels too weak or dizzy to stand    Negative: Heart beating < 50 beats per minute OR > 140 beats per minute    Negative: Sounds like a life-threatening emergency to the triager    Negative: Chest pain    Negative: Rectal bleeding, bloody stool, or tarry-black stool    Negative: [1] Vomiting AND [2] contains red blood or black (\"coffee ground\") material    Negative: Vomiting is main symptom    Negative: Diarrhea is main symptom    Negative: Headache is main symptom    Negative: Patient states that he/she is having an anxiety/panic attack    Negative: Dizziness from low blood sugar (i.e., < 60 mg/dl or 3.5 mmol/l)    Negative: Dizziness is described as a spinning sensation (i.e., vertigo)    Negative: Heat exhaustion suspected (i.e., dehydration from heat exposure)    Negative: Difficulty breathing    Negative: SEVERE dizziness (e.g., unable to stand, requires support to walk, feels like passing out now)    Negative: Extra heart beats OR irregular heart beating  (i.e., \"palpitations\")    Negative: [1] Drinking very little AND [2] dehydration suspected (e.g., no urine > 12 hours, very dry mouth, very lightheaded)    Negative: Patient sounds very sick or weak to the triager    Negative: [1] Dizziness caused by heat exposure, sudden standing, or poor fluid intake AND [2] no improvement after 2 hours of rest and fluids    Negative: [1] Fever > 103 F (39.4 C) AND [2] not able to get the fever down using Fever Care Advice    Negative: [1] Fever > 101 F (38.3 C) AND [2] age > 60    Negative: [1] Fever > 100.0 F (37.8 C) AND [2] bedridden (e.g., nursing home patient, CVA, chronic illness, recovering from surgery)    Negative: [1] Fever > 100.0 F (37.8 C) AND [2] diabetes mellitus or weak " immune system (e.g., HIV positive, cancer chemo, splenectomy, chronic steroids)    Negative: [1] MODERATE dizziness (e.g., interferes with normal activities) AND [2] has NOT been evaluated by physician for this  (Exception: dizziness caused by heat exposure, sudden standing, or poor fluid intake)    Negative: Fever present > 3 days (72 hours)    Negative: Taking a medicine that could cause dizziness (e.g., blood pressure medications, diuretics)    Protocols used: DIZZINESS - KUXATUVLHEPININ-F-OF

## 2019-05-27 ENCOUNTER — NURSE TRIAGE (OUTPATIENT)
Dept: NURSING | Facility: CLINIC | Age: 69
End: 2019-05-27

## 2019-05-27 NOTE — TELEPHONE ENCOUNTER
Reason for call; Pt called . Vague historian  But wanted to cancel her appt for tomorrow and thinks her Buspar is working fine . Reminded Pt that she has refill just thru June and need a follow up appt for Rx Buspar evaluation  .        Caller Verbalizes understanding and denies further questions and will call back if further symptoms to triage or questions  .  Cleo Burgess RN  - [place] Nurse Advisor

## 2019-05-28 ENCOUNTER — TELEPHONE (OUTPATIENT)
Dept: FAMILY MEDICINE | Facility: CLINIC | Age: 69
End: 2019-05-28

## 2019-05-28 DIAGNOSIS — M54.42 ACUTE LEFT-SIDED LOW BACK PAIN WITH LEFT-SIDED SCIATICA: ICD-10-CM

## 2019-05-28 RX ORDER — BUSPIRONE HYDROCHLORIDE 10 MG/1
TABLET ORAL
Qty: 60 TABLET | Refills: 1 | COMMUNITY
Start: 2019-05-28 | End: 2019-05-28

## 2019-05-28 RX ORDER — BUSPIRONE HYDROCHLORIDE 10 MG/1
TABLET ORAL
Qty: 60 TABLET | Refills: 3 | Status: SHIPPED | OUTPATIENT
Start: 2019-05-28 | End: 2019-07-29

## 2019-05-28 NOTE — TELEPHONE ENCOUNTER
Notified patient of the message below.    Patient stated that she needs refills sent to the pharmacy.        Also please see message below regarding Glipizide. She is wondering if she can cut down or discontinue.  Patient stated that he BS has been 108-144.    Patricia Ramires RN [facility] Triage.

## 2019-05-28 NOTE — TELEPHONE ENCOUNTER
Reason for call:  Patient reporting a symptom    Symptom or request: Patient calling stating that she was started on Buspirone. She said it's working well. She has realized that about this time of the day (around lunch hour), she gets some slight lightheadedness. She is also taking a [employer] multivitamin and fish oil. She is not sure if it's the vitamins or the buspirone that is making her feel lightheaded. She is wondering if she should cut her morning dose in half? She does not want to go off medication as it is working really well other than the lightheadedness.     Duration (how long have symptoms been present): started this past Saturday    Have you been treated for this before? No    Additional comments: Patient uses [facility]    Phone Number patient can be reached at:  Cell number on file:    Telephone Information:   Mobile [phone]    or [phone]    Best Time:  any    Can we leave a detailed message on this number:  YES    Call taken on 5/28/2019 at 12:35 PM by Gracy Ortiz

## 2019-05-28 NOTE — TELEPHONE ENCOUNTER
Patient called back and said that the dizziness might be coming from the medication glipiZIDE (GLUCOTROL XL) 2.5 MG 24 hr tablet she would like to discuss with a nurse

## 2019-05-28 NOTE — TELEPHONE ENCOUNTER
Sent a new rx  Reg gilizide, if BS remain above 90 and she has no symptoms of low BS, I would recommend continue with it, until she follows up in the next a few months and repeat A1C  thanks

## 2019-05-29 ENCOUNTER — TELEPHONE (OUTPATIENT)
Dept: FAMILY MEDICINE | Facility: CLINIC | Age: 69
End: 2019-05-29

## 2019-05-29 NOTE — TELEPHONE ENCOUNTER
Reason for Call:  Other     Detailed comments: Patient would like to discuss buspirone. Can you take multi vitamins, fish oil and have a cup of coffee while on this medication? [facility] pharmacy.      Phone Number Patient can be reached at: Home number on file [phone] (home)    Best Time:     Can we leave a detailed message on this number? YES    Call taken on 5/29/2019 at 9:34 AM by Bridget Hamilton

## 2019-05-29 NOTE — TELEPHONE ENCOUNTER
Patient called back and stated that she spoke with the pharmacist and they were able to answer all of her questions. Patient does not need a call back.

## 2019-06-03 ENCOUNTER — TELEPHONE (OUTPATIENT)
Dept: FAMILY MEDICINE | Facility: CLINIC | Age: 69
End: 2019-06-03

## 2019-06-03 NOTE — TELEPHONE ENCOUNTER
Reason for Call:  Other / Med question ( glipiZIDE (GLUCOTROL XL)     Detailed comments: Patient called and stated she would like to know if a different medication can be prescribed since she gets very dizzy, nausea and upset stomach.  Patient also stated she would also like to discuss if she could be taken off any medication and watch the sugar levels herself. Please call patient back to discuss.    Phone Number Patient can be reached at: Home number on file [phone] (home)    Best Time: Anytime    Can we leave a detailed message on this number? YES    Call taken on 6/3/2019 at 5:00 PM by Jovita Aquino

## 2019-06-06 NOTE — TELEPHONE ENCOUNTER
Patient returned call - left voice mail.    Attempt # 1  Called patient at home number.[phone]  Was call answered?  Yes,  today, states does not have insurance at this time, is hoping to have insurance after the first of the year.  If BS is good, will stay off pills. States is eating better, no fats, sugars, only low sodium turkey.     Nurse advised patient to at least schedule a lab only visit to recheck the A1C in 12 to 15 weeks - less expensive then OV when does not have insurance. Also advised to call clinic if BS > 140 regularly. Patient verbalized understanding and agreement with plan.      Margaret Morillo RN  [facility]

## 2019-06-06 NOTE — TELEPHONE ENCOUNTER
Called patient.  Left message on voicemail to return phone call to triage line at [phone].  Patricia Ramries RN [facility] Triage.

## 2019-06-06 NOTE — TELEPHONE ENCOUNTER
We could switch it to short acting Glipizide at 2. 5 mg, may cause less side effect  Last A1c was done over 6 months  She could follow up in the Clinic , repeat A1c and go from there  thanks

## 2019-06-06 NOTE — TELEPHONE ENCOUNTER
She could hold her medication for 3 months  Continue working with weight and diet  Follow up in 12- 15 wks, to repeat A1c  thanks

## 2019-06-06 NOTE — TELEPHONE ENCOUNTER
Routed to PCP and then will discuss other orders with patient.  Patricia Ramires, RADHA [facility] Triage.

## 2019-06-06 NOTE — TELEPHONE ENCOUNTER
Patient now would like to try without medications stated she took he blood sugar levels with husbands device and it was 107. Please advise.

## 2019-07-02 ENCOUNTER — TELEPHONE (OUTPATIENT)
Dept: FAMILY MEDICINE | Facility: CLINIC | Age: 69
End: 2019-07-02

## 2019-07-02 NOTE — TELEPHONE ENCOUNTER
Reason for Call:  Other / Requests call back    Detailed comments: Patient called and stated she is still waiting for a response/call back.    Phone Number Patient can be reached at: Cell number on file:    Telephone Information:   Mobile [phone]       Best Time: ASAP    Can we leave a detailed message on this number? YES    Call taken on 7/2/2019 at 2:42 PM by Jovita Aquino

## 2019-07-02 NOTE — TELEPHONE ENCOUNTER
Reason for Call:  Other     Detailed comments: patient states she is doing a lot better and has lost a lot of weight, she wants to update that she stopped water pill because it is making her dizzy, she states she wants to take the least amount of medicine, please call her to go over her medications and review which one she can either stop or lower. She states she is currently just taking 2 medicines.      Phone Number Patient can be reached at: Home number on file [phone] (home)    Best Time: anytime    Can we leave a detailed message on this number? YES    Call taken on 7/2/2019 at 12:42 PM by Mary Perez

## 2019-07-02 NOTE — TELEPHONE ENCOUNTER
"Patient was last seen 4/4/19 by Dr. Mercedes.    See plan:    History of diabetes which been well controlled she was advised to continue with her glipizide 1 tablet daily and continue with dieting and weight loss.  She was advised to follow-up in 4-6 weeks or sooner  Patient Instructions   Take mobic in AM , 1-2 tab daily with breakfast as neded  Flexeril at night as needed  Start Buspar one tab 2x a day.  Follow up in 4- 6 weeks or sooner.        Pierer Mercedes MD  [facility]    I see several phone calls in April and May regarding dizziness and med changes.   Most recently 5/28/19 Dr. Mercedes advised she continue glipizide and follow up \"in the next few months\".    See note below, need to contact patient to reconcile meds and see which she is still on (?only on 2?).    Attempted to call patient at home number, left message on voicemail; patient was instructed to return call to [facility] RN directly on the RN call back line at [phone].   Which 2 meds and what doses is she still on?      Betty Duran, RADHA  [facility]          "

## 2019-07-03 NOTE — TELEPHONE ENCOUNTER
"Patient called back and stated that the only meds she is taking now is Atenolol and ASA 81 mg tabs.  She stated that she stopped her \"water pill\" and her BP has been ok.  She could not tell me what the readings were.  She stated that she does not want to take it anymore.    Routed to PCP.    Patricia Ramires RN [facility] Triage.    "

## 2019-07-05 DIAGNOSIS — I25.10 CORONARY ARTERY DISEASE INVOLVING NATIVE HEART WITHOUT ANGINA PECTORIS, UNSPECIFIED VESSEL OR LESION TYPE: ICD-10-CM

## 2019-07-05 RX ORDER — ATENOLOL 50 MG/1
50 TABLET ORAL DAILY
Qty: 90 TABLET | Refills: 0 | Status: CANCELLED | OUTPATIENT
Start: 2019-07-05

## 2019-07-05 NOTE — TELEPHONE ENCOUNTER
"Requested Prescriptions   Pending Prescriptions Disp Refills     atenolol (TENORMIN) 50 MG tablet 90 tablet 0     Sig: Take 1 tablet (50 mg) by mouth daily   Last Written Prescription Date:  5/22/19  Last Fill Quantity: 90,  # refills: 0   Last office visit: 4/4/2019 with prescribing provider:     Future Office Visit:        Beta-Blockers Protocol Passed - 7/5/2019  3:46 PM        Passed - Blood pressure under 140/90 in past 12 months     BP Readings from Last 3 Encounters:   01/08/19 130/78   08/29/18 148/74   11/15/17 145/83                 Passed - Patient is age 6 or older        Passed - Recent (12 mo) or future (30 days) visit within the authorizing provider's specialty     Patient had office visit in the last 12 months or has a visit in the next 30 days with authorizing provider or within the authorizing provider's specialty.  See \"Patient Info\" tab in inbasket, or \"Choose Columns\" in Meds & Orders section of the refill encounter.              Passed - Medication is active on med list          "

## 2019-07-09 NOTE — TELEPHONE ENCOUNTER
I called pharmacy, spoke to Sharon who reports patient got 90 tabs on 5/22/19; says patient often calls early for her refills.    Patient was last seen 4/4/19 by Dr. Mercedes, see plan:    Follow up in 4- 6 weeks or sooner.    Appears she has cancelled several follow ups since then.      Encounter routed to team to reach out to patient to schedule follow up, should not be out of atenolol yet.  Verify that, route back to nurse or PCP to address refill after scheduled if patient says she is out.  Betty Duran, RADHA  [facility]

## 2019-07-09 NOTE — TELEPHONE ENCOUNTER
Reason for Call:  Other returning call    Detailed comments: Patient denied appointment, stated she does not have insurance and wants to wait until medication is running out to make an appointment. Scheduled informed patient pcp would like to follow up 4-6weeks from appt in April. Patient still denied appointment and requesting for nurse to call. Patient stated heart rate is good, bp is good, diabetes is good and denies to be seen by pcp at this time. Please advise.     Phone Number Patient can be reached at: Home number on file [phone] (home)    Best Time: Anytime    Can we leave a detailed message on this number? YES    Call taken on 7/9/2019 at 1:40 PM by Madonna Chandra

## 2019-07-11 RX ORDER — ATENOLOL 50 MG/1
50 TABLET ORAL DAILY
Qty: 90 TABLET | Refills: 1 | Status: SHIPPED | OUTPATIENT
Start: 2019-07-11 | End: 2020-03-03

## 2019-07-12 ENCOUNTER — TELEPHONE (OUTPATIENT)
Dept: FAMILY MEDICINE | Facility: CLINIC | Age: 69
End: 2019-07-12

## 2019-07-12 NOTE — TELEPHONE ENCOUNTER
Reason for Call:  Other / Spoke to pharmacist    Detailed comments: Patient called and stated she spoke to the pharmacist and was told that probably her dizziness is due to a small ear infection that would go away on its own, therefore, patient is asking PCP not to call back.  Patient also stated medication controls her blood pressure really well and she does not want to change it.    Phone Number Patient can be reached at: Cell number on file:    Telephone Information:   Mobile [phone]       Best Time: Aytime    Can we leave a detailed message on this number? YES    Call taken on 7/12/2019 at 8:51 AM by Jovita Aquino

## 2019-07-12 NOTE — TELEPHONE ENCOUNTER
Reason for Call:  Med question (atenolol (TENORMIN)     Detailed comments: Patient called and stated she is taking atenolol (TENORMIN) and blood pressure is very well, however, she is feeling a little dizzy and drowsy.  Patient is requesting a call back to discuss whether she could take a lower dose or have another medication prescribed, however, not an expensive one since patient stated they do not have insurance.    Phone Number Patient can be reached at: Home number on file [phone] (home)    Best Time: Anytime    Can we leave a detailed message on this number? YES    Call taken on 7/12/2019 at 8:39 AM by Jovita Aquino

## 2019-07-16 ENCOUNTER — TELEPHONE (OUTPATIENT)
Dept: FAMILY MEDICINE | Facility: CLINIC | Age: 69
End: 2019-07-16

## 2019-07-16 NOTE — TELEPHONE ENCOUNTER
Reason for call:  Patient reporting a symptom    Symptom or request: Ear wax    Duration (how long have symptoms been present): Week ago    Have you been treated for this before? No    Additional comments: Patient called and stated she has ear wax on her left ear and she has been using a wax removal kit since yesterday.  Patient also stated that the instructions indicate to use the drops for four days, however, patient would like to know if it would be okay to use the drops a little longer and also if it would be safe to use the included sponge to rinse out the ear canal with warm water.  Patient is requesting a call back to discuss because she does not have insurance and does not want to come in since it would be very expensive.    Phone Number patient can be reached at:  Cell number on file:    Telephone Information:   Mobile [phone]     Best Time:  ASAP    Can we leave a detailed message on this number:  YES    Call taken on 7/16/2019 at 3:24 PM by Jovita Aquino

## 2019-07-16 NOTE — TELEPHONE ENCOUNTER
Reason for Call:  Other / No need to call back    Detailed comments: Patient called again and stated she spoke to the pharmacist and he answered her questions, therefore, there is no need for a call back from the care team.    Phone Number Patient can be reached at: Cell number on file:    Telephone Information:   Mobile [phone]       Best Time: Anytime    Can we leave a detailed message on this number? YES    Call taken on 7/16/2019 at 4:01 PM by Jovita Aquino

## 2019-07-22 ENCOUNTER — TELEPHONE (OUTPATIENT)
Dept: FAMILY MEDICINE | Facility: CLINIC | Age: 69
End: 2019-07-22

## 2019-07-22 NOTE — TELEPHONE ENCOUNTER
Reason for Call:  Other     Detailed comments: patient would like to speak to a nurse she didn't say the reason. Please call the patient     Phone Number Patient can be reached at: Home number on file [phone] (home)    Best Time: any    Can we leave a detailed message on this number? YES    Call taken on 7/22/2019 at 2:32 PM by Dayana Nguyen

## 2019-07-22 NOTE — TELEPHONE ENCOUNTER
Patient called and stated to disregard message below. She stated her question was answered by the pharmacist.    Thank you  Karal Keita  Patient Representative

## 2019-07-26 ENCOUNTER — NURSE TRIAGE (OUTPATIENT)
Dept: FAMILY MEDICINE | Facility: CLINIC | Age: 69
End: 2019-07-26

## 2019-07-26 NOTE — TELEPHONE ENCOUNTER
Additional Information    Negative: Ear injury is main concern    Protocols used: [id number]-A-OH      Called patient and she stated that this has been taken care of.    Patricia Ramires RN [facility] Triage.

## 2019-07-26 NOTE — TELEPHONE ENCOUNTER
Reason for call:  Patient reporting a symptom    Symptom or request: EAR wax build up and been using warm water.  PT would like suggestions on what to do and can't afford a visit.      Duration (how long have symptoms been present): Month of July    Have you been treated for this before? No    Additional comments: PT doesn't have insurance.  Can leave instructions on VM too.    Phone Number patient can be reached at:  Home number on file [phone] (home)    Best Time:  Anytime    Can we leave a detailed message on this number:  YES    Call taken on 7/26/2019 at 12:33 PM by Tri Sibley

## 2019-07-26 NOTE — TELEPHONE ENCOUNTER
Called patient at [phone] (home). Left message on voicemail to return phone call to triage line at [phone].  Patricia Ramires RN [facility] Triage.

## 2019-07-29 ENCOUNTER — OFFICE VISIT (OUTPATIENT)
Dept: FAMILY MEDICINE | Facility: CLINIC | Age: 69
End: 2019-07-29

## 2019-07-29 VITALS
DIASTOLIC BLOOD PRESSURE: 72 MMHG | BODY MASS INDEX: 34.01 KG/M2 | HEART RATE: 54 BPM | TEMPERATURE: 96.9 F | SYSTOLIC BLOOD PRESSURE: 150 MMHG | WEIGHT: 192 LBS | OXYGEN SATURATION: 100 %

## 2019-07-29 DIAGNOSIS — H65.92 OME (OTITIS MEDIA WITH EFFUSION), LEFT: Primary | ICD-10-CM

## 2019-07-29 DIAGNOSIS — H61.23 BILATERAL IMPACTED CERUMEN: ICD-10-CM

## 2019-07-29 PROCEDURE — 69209 REMOVE IMPACTED EAR WAX UNI: CPT | Mod: 50 | Performed by: FAMILY MEDICINE

## 2019-07-29 PROCEDURE — 99213 OFFICE O/P EST LOW 20 MIN: CPT | Mod: 25 | Performed by: FAMILY MEDICINE

## 2019-07-29 RX ORDER — AMOXICILLIN 875 MG
875 TABLET ORAL 2 TIMES DAILY
Qty: 20 TABLET | Refills: 0 | Status: SHIPPED | OUTPATIENT
Start: 2019-07-29 | End: 2019-08-07

## 2019-07-29 ASSESSMENT — PAIN SCALES - GENERAL: PAINLEVEL: NO PAIN (0)

## 2019-07-29 NOTE — PROGRESS NOTES
Subjective     Deanna Butler is a 69 year old female who presents to clinic today for the following health issues:    HPI   Patient is present today with plugged ears. She stated that it started about a month ago. Patient also said that she feels dizzy, but is not sure if it from her Atenolol or her ears.  Patient comes in today reports she is feeling pressure pain in both of her ears , left worse than the right.  This been bothering her for over a month now.    She denies any sore throat, she does report sneezing, sinus congestion.  She denies fevers chills night sweats.    Patient currently taking only blood pressure medication, she does have history of diabetes she has lost a significant amount of weight she reports her blood sugars been in the 120s and she does not use any diabetes medication.   She has lost over 50 pounds since 2018.     Patient Active Problem List   Diagnosis     CAD (coronary artery disease)     Hyperlipidemia LDL goal <100     CKD (chronic kidney disease) stage 3, GFR 30-59 ml/min (H)     Type 2 diabetes, HbA1C goal < 8% (H)     Venous stasis dermatitis of both lower extremities     Morbid obesity (H)     Hypertension goal BP (blood pressure) < 150/90     SHANON (generalized anxiety disorder)     Past Surgical History:   Procedure Laterality Date     STENT Bilateral 2011    RCA disease, S/P stent       Social History     Tobacco Use     Smoking status: Former Smoker     Last attempt to quit: 1980     Years since quittin.8     Smokeless tobacco: Never Used   Substance Use Topics     Alcohol use: No     Comment: occasional wine     Family History   Problem Relation Age of Onset     Hypertension Mother      Arthritis Mother      Cancer Father      Cancer Brother      Heart Disease Sister          Current Outpatient Medications   Medication Sig Dispense Refill     ACE/ARB/ARNI NOT PRESCRIBED, INTENTIONAL, Please choose reason not prescribed, below       amoxicillin  (AMOXIL) 875 MG tablet Take 1 tablet (875 mg) by mouth 2 times daily for 10 days 20 tablet 0     ASPIRIN PO Take 81 mg by mouth       atenolol (TENORMIN) 50 MG tablet Take 1 tablet (50 mg) by mouth daily 90 tablet 1     cholecalciferol (VITAMIN D3) 400 unit (10 mcg) TABS tablet Take 1 tablet (400 Units) by mouth twice a week       fish oil-omega-3 fatty acids (FISH OIL) 1000 MG capsule Take 2 g by mouth daily.       nitroGLYcerin (NITROSTAT) 0.4 MG sublingual tablet Place 1 tablet (0.4 mg) under the tongue every 5 minutes as needed 25 tablet 0     order for DME Equipment being ordered: BP machine. (Patient not taking: Reported on 7/29/2019) 1 Device 0       Reviewed and updated as needed this visit by Provider         Review of Systems   ROS COMP: Constitutional, HEENT, cardiovascular, pulmonary, gi and gu systems are negative, except as otherwise noted.      Objective    Pulse 54   Temp 96.9  F (36.1  C) (Oral)   Wt 87.1 kg (192 lb)   SpO2 100%   Breastfeeding? No   BMI 34.01 kg/m    Body mass index is 34.01 kg/m .  Physical Exam   GENERAL: healthy, alert and no distress  HENT: both ears: occluded with wax  Status post irrigation.  Left ear, tympanic membrane was inflamed bulging erythematous  Diagnostic Test Results:  Labs reviewed in Epic          Assessment & Plan       ICD-10-CM    1. OME (otitis media with effusion), left H65.92 amoxicillin (AMOXIL) 875 MG tablet   2. Bilateral impacted cerumen H61.23      I did irrigate both of her ears.  The significant amount of wax came from her left ear canal.    Her left ear canal, was normal with normal landmark.  However, her left tympanic membrane was bulging and erythematous.  She was started on amoxicillin    Follow up for physical and diabetic check.    No follow-ups on file.    Pierre Mercedes MD  [facility]

## 2019-08-07 ENCOUNTER — TELEPHONE (OUTPATIENT)
Dept: FAMILY MEDICINE | Facility: CLINIC | Age: 69
End: 2019-08-07

## 2019-08-07 DIAGNOSIS — H65.92 OME (OTITIS MEDIA WITH EFFUSION), LEFT: ICD-10-CM

## 2019-08-07 RX ORDER — AMOXICILLIN 875 MG
875 TABLET ORAL 2 TIMES DAILY
Qty: 20 TABLET | Refills: 0 | Status: SHIPPED | OUTPATIENT
Start: 2019-08-07

## 2019-08-07 NOTE — TELEPHONE ENCOUNTER
Please call as patient requested to get more information on her symptoms.  Not sure what is still bothering her.    Thanks,  Rajeev Palacio MD

## 2019-08-07 NOTE — TELEPHONE ENCOUNTER
Patient left a message on triage line stating that she saw PCP on 7/29/19 for ear pain, ear plugged and ear infection that she has had for over a month.  She was also having buzzing in her ear.    She stated that she still continues to hear the buzzing, and she is still having ear pain.  The pain is better, but not all the way cleared up.  She is wondering if she can get another round of antibiotics.    Patricia Ramires RN [facility] Triage.

## 2019-08-07 NOTE — TELEPHONE ENCOUNTER
Reason for Call:  Other returning call    Detailed comments: Patient stated she left several messages on RN Triage line and have not heard back. Please advise.     Phone Number Patient can be reached at: Home number on file [phone] (home)    Best Time: Anytime    Can we leave a detailed message on this number? YES    Call taken on 8/7/2019 at 1:06 PM by Madonna Chandra

## 2019-08-07 NOTE — TELEPHONE ENCOUNTER
Notified patient of the message below per provider.    Patient stated understanding and agreeable with the plan of care. MARIELLA SorianoN,RN, [facility] Triage.

## 2019-08-07 NOTE — TELEPHONE ENCOUNTER
Called patient at [phone] (home) .Left message on voicemail to return phone call to triage line at [phone].  Patricia Ramires RN [facility] Triage.

## 2019-08-07 NOTE — TELEPHONE ENCOUNTER
I sent a refill to the pharmacy for the amoxicillin and she can start that.  I would also recommend an nasal spray like flonase or similar.  They are otc and will help with the congestion and can also be purchased at the pharmacy.  The pharmacist can direct her towards one of the nasal steroid sprays as this will decrease inflammation and help the fluid clear.    Please call to let patient know.    Rajeev Palacio MD

## 2019-08-07 NOTE — TELEPHONE ENCOUNTER
Routed to covering provider.  Please see previous office visit and calls.      Patricia Ramires RN [facility] Triage.

## 2019-08-07 NOTE — TELEPHONE ENCOUNTER
Reason for Call:  Other     Detailed comments: Patient requesting for nurse to call with recommendations, patient stated ear is still not better. Please advise.     Phone Number Patient can be reached at: Home number on file [phone] (home)    Best Time: Anytime    Can we leave a detailed message on this number? YES    Call taken on 8/7/2019 at 8:16 AM by Madonna Chandra

## 2019-08-13 ENCOUNTER — TELEPHONE (OUTPATIENT)
Dept: FAMILY MEDICINE | Facility: CLINIC | Age: 69
End: 2019-08-13

## 2019-08-13 DIAGNOSIS — H65.92 OME (OTITIS MEDIA WITH EFFUSION), LEFT: Primary | ICD-10-CM

## 2019-08-13 NOTE — TELEPHONE ENCOUNTER
Reason for call:  Patient reporting a symptom    Symptom or request: Ear ache and ringing    Duration (how long have symptoms been present): 2-3 months    Have you been treated for this before? Yes    Additional comments: PT wondering if still continue use of Flonase (OTC) and difficulty sleeping.  PT would like guidance.    Phone Number patient can be reached at:  Home number on file [phone] (home)    Best Time:  Anytime    Can we leave a detailed message on this number:  YES       Call taken on 8/13/2019 at 5:42 PM by Tri Sibley

## 2019-08-14 NOTE — TELEPHONE ENCOUNTER
Notified patient of the message below. She stated that she will see ENT and has an appointment on 8/21/19 in [place].    Patient stated understanding and agreeable with the plan of care. MARIELLA SorianoN,RN, [facility] Triage.

## 2019-08-14 NOTE — TELEPHONE ENCOUNTER
Please call patient and let her know it is fine to continue to use the flonase.  While Dr. Mercedes is still out until 8/23 she may need to be seen for further guidance or change in therapy.  I will also place an ENT referral if she would prefer to see them in follow up since her symptoms are not improving as expected.    Rajeev Palacio MD

## 2019-08-14 NOTE — TELEPHONE ENCOUNTER
Patient returned call - left VM - please call [phone]    Margaret Morillo RN  [facility]

## 2019-08-19 ENCOUNTER — TELEPHONE (OUTPATIENT)
Dept: FAMILY MEDICINE | Facility: CLINIC | Age: 69
End: 2019-08-19

## 2019-08-19 NOTE — TELEPHONE ENCOUNTER
?pharmacy?    Is she dizzy?       I called patient, she says she is no longer dizzy.   Denies her ears are plugged, not having trouble hearing.  Says her left ear is still buzzing.    She has heard that vitamin b12 and b6 are good for tinnitus.    She says she does not think the flonase is helping.    She has a multivitamin with B12 and B6 in it.      Or is there any other inexpensive Rx or solution?   Patient states she cannot afford to come in or get any expensive Rx.     Routed to provider/covering provider to advise.        Betty Duran RN  [facility]

## 2019-08-19 NOTE — TELEPHONE ENCOUNTER
Reason for call:  Patient reporting a symptom    Symptom or request: Buzzing in ear    Duration (how long have symptoms been present): several days    Have you been treated for this before? Yes    Additional comments: Patient was in on 7/29/19 and had her ears flushed out by Dr Mercedes.  She states she has finished her antibiotic and is still having  Buzzing in the ear.  She is wondering if there is a medication that she could get for this?  Patient states she is unable to come in for an appointment because she has a bill at [place] that she would like to pay before she comes in again.  She also will not be able to see a specialist because of the cost.  Please call patient to discuss her next steps of care.      Phone Number patient can be reached at:  Home number on file [phone] (home)    Best Time:  ASAP    Can we leave a detailed message on this number:  YES    Call taken on 8/19/2019 at 6:27 PM by Kat Campos

## 2019-08-20 NOTE — TELEPHONE ENCOUNTER
Called patient.  Left message to return phone call to triage line at [phone].  Patricia Ramires RN [facility] Triage.

## 2019-08-20 NOTE — TELEPHONE ENCOUNTER
"Patient was not complaining of tenderness but rather buzzing or ringing in the ear.   Routed to provider to please correct/clarify note (unsure of the \"herpes\" statement).    Betty Duran RN  [facility]      "

## 2019-08-20 NOTE — TELEPHONE ENCOUNTER
There are no proven vitamins that help tinnitus .  It is a condition that is usually from damage to the hair cells and the nerves in the ear.  It is only treated by therapy  directed at learning how to mask the noise, or use of hearing aids since it is usually associated with high-frequency hearing loss, and restoring the high-frequency hearing helps mask the noise..  Vitamins are not felt to be helpful.

## 2019-08-20 NOTE — TELEPHONE ENCOUNTER
"My note was regarding tinnitus which is buzzing in the ear and what I stated was what I meant to say about it.  The dictation transcription ( neida ) picked up the word :tenderness\" instead of tinnitus.     It also mistook the word\" herpes\" it should have said \"therapy directed at \"     See corrected note.     "

## 2019-08-22 NOTE — TELEPHONE ENCOUNTER
Patient returned call to RN line at 3:16 pm and left message for call back to her.  Betty Duran RN  [facility]

## 2019-08-22 NOTE — TELEPHONE ENCOUNTER
Called patient at [phone] (home). Left message on voicemail to return phone call to triage line at [phone].  Patricia Ramires RN [facility] Triage.

## 2019-08-22 NOTE — TELEPHONE ENCOUNTER
Called patient and relayed the message below. Patient very anxious and asking many questions. Patient concerned because the buzzing bothers her much more at night. Nurse recommended using a noise machine, a fan, or a radio to distract her from the buzzing to see if that helps. Patient verbalized understanding.     Miladis Rothman RN

## 2019-09-12 ENCOUNTER — TELEPHONE (OUTPATIENT)
Dept: FAMILY MEDICINE | Facility: CLINIC | Age: 69
End: 2019-09-12

## 2019-09-12 NOTE — LETTER
September 12, 2019    Deanna Butler  [address] [place] [address] [address]  [facility] [address]    Dear Deanna    We care about your health and have reviewed your health plan. We have reviewed your medical conditions, medication list, and lab results and are making recommendations based on this review, to better manage your health.    You are in particular need of attention regarding:  - Your Diabetes  - Completing a Colon Cancer Screening (FIT) - Please complete FIT test can be picked up at the clinic and mail completed test to clinic.  - Scheduling a Wellness (Physical/Lab) Visit age 65 and older [phone]      Here is a list of Health Maintenance topics that are due now or due soon:  Health Maintenance Due   Topic Date Due     DEXA  1950     ADVANCE CARE PLANNING  1950     EYE EXAM  1950     FIT  03/08/1959     DTAP/TDAP/TD IMMUNIZATION (1 - Tdap) 03/08/1974     ZOSTER IMMUNIZATION (1 of 2) 03/08/1999     MICROALBUMIN  07/19/2013     MEDICARE ANNUAL WELLNESS VISIT  03/08/2014     PNEUMOCOCCAL IMMUNIZATION 65+ LOW/MEDIUM RISK (1 of 2 - PCV13) 03/08/2014     DIABETIC FOOT EXAM  10/13/2018     FALL RISK ASSESSMENT  11/15/2018     A1C  07/08/2019     LIPID  08/29/2019     INFLUENZA VACCINE (1) 09/01/2019       Please call us at [phone] (or use [employer]) to address the above recommendations. If we do not hear from you in the next couple of weeks we will be reaching out to you again.    Thank you for trusting [facility] and we appreciate the opportunity to serve you.  We look forward to supporting your healthcare needs in the future.    Healthy Regards,    Your Care Team      Team 2

## 2019-09-12 NOTE — TELEPHONE ENCOUNTER
Panel Management Review  Panel Management Review  Summary:    Type of outreach:    Patient did returned call per notes in chart. A final reminder letter will be mailed to patient.    Encounter routed to No Action Needed.                                                                                                                               Sonja Culver CMA on 12/2/2019 at 1:53 PM     Summary:    Type of outreach:    Phone, left message for patient to call back.     Encounter routed to Care Team.                                                                                                                               Sonja Culver CMA on 11/25/2019 at 2:39 PM       Panel Management Review      Patient has the following on her problem list:     Diabetes    ASA: Passed    Last A1C  Lab Results   Component Value Date    A1C 6.6 01/08/2019    A1C 6.5 08/29/2018    A1C 6.3 10/13/2017    A1C 6.8 07/19/2012    A1C 6.5 09/16/2010     A1C tested: FAILED    Last LDL:    Lab Results   Component Value Date    CHOL 207 08/29/2018     Lab Results   Component Value Date    HDL 50 08/29/2018     Lab Results   Component Value Date     08/29/2018     Lab Results   Component Value Date    TRIG 194 08/29/2018     Lab Results   Component Value Date    CHOLHDLRATIO 3.3 07/19/2012     Lab Results   Component Value Date    NHDL 157 08/29/2018       Is the patient on a Statin? NO             Is the patient on Aspirin? YES    Medications     Salicylates     ASPIRIN PO             Last three blood pressure readings:  BP Readings from Last 3 Encounters:   07/29/19 150/72   01/08/19 130/78   08/29/18 148/74       Date of last diabetes office visit: 01/08/2019     Tobacco History:     History   Smoking Status     Former Smoker     Quit date: 9/16/1980   Smokeless Tobacco     Never Used           Composite cancer screening  Chart review shows that this patient is due/due soon for the following Fecal Colorectal  (FIT)  Summary:    Patient is due/failing the following:   FIT and PHYSICAL    Action needed:   Patient needs office visit for Wellness Exam. and Patient has a future order for FIT    Type of outreach:    Sent letter.    Questions for provider review:    None                                                                                                                                    Sonja Culver CMA on 9/12/2019 at 12:37 PM       Chart routed to Care Team .

## 2019-09-12 NOTE — LETTER
December 2, 2019    Deanna Butler  [address] [place] [address] [address]  [facility] [address]      Dear Deanna Butler,     We have tried to contact you about your health, but have been unable to reach you.  Please call us as soon as possible so we can provide you with the best care possible.  We will continue to check in with you throughout the year to complete these items of care, if you are not able to complete these items at this time.  If you would like to complete the missing items for your care, please contact us at [phone].    We recommend the following:  -schedule a WELLNESS (Physical) APPOINTMENT with your provider.   I will check fasting labs the same day - nothing to eat except water and meds for 8-10 hours prior.  -complete a FIT TEST a FIT test or Fecal Immunochemical Occult Blood Test is a take home stool sample kit.  It does not replace the colonoscopy for colorectal cancer screening, but it can detect hidden bleeding in the lower colon.  It does need to be repeated every year and if a positive result is obtained, you would be referred for a colonoscopy.  If you have completed either one of these tests at another facility, please have the records sent to our clinic so that we can best coordinate your care.        Sincerely,     Your Care Team at [facility]      Team 2

## 2019-11-25 NOTE — TELEPHONE ENCOUNTER
PT called and stated feeling well.  Did not want to come into the clinic since it was flu season but would if felt ill.  Losing weight and exercising x30 minutes per day.

## 2020-02-24 ENCOUNTER — HEALTH MAINTENANCE LETTER (OUTPATIENT)
Age: 70
End: 2020-02-24

## 2020-03-02 ENCOUNTER — TELEPHONE (OUTPATIENT)
Dept: FAMILY MEDICINE | Facility: CLINIC | Age: 70
End: 2020-03-02

## 2020-03-02 DIAGNOSIS — I25.10 CORONARY ARTERY DISEASE INVOLVING NATIVE HEART WITHOUT ANGINA PECTORIS, UNSPECIFIED VESSEL OR LESION TYPE: ICD-10-CM

## 2020-03-02 NOTE — TELEPHONE ENCOUNTER
"Requested Prescriptions   Pending Prescriptions Disp Refills     atenolol (TENORMIN) 50 MG tablet 90 tablet 1     Sig: Take 1 tablet (50 mg) by mouth daily   Last Written Prescription Date:  7-11-19  Last Fill Quantity: 90,  # refills: 1   Last office visit: 7/29/2019 with prescribing provider:  7-29-19   Future Office Visit:        Beta-Blockers Protocol Failed - 3/2/2020  5:07 PM        Failed - Blood pressure under 140/90 in past 12 months     BP Readings from Last 3 Encounters:   07/29/19 150/72   01/08/19 130/78   08/29/18 148/74                 Passed - Patient is age 6 or older        Passed - Recent (12 mo) or future (30 days) visit within the authorizing provider's specialty     Patient has had an office visit with the authorizing provider or a provider within the authorizing providers department within the previous 12 mos or has a future within next 30 days. See \"Patient Info\" tab in inbasket, or \"Choose Columns\" in Meds & Orders section of the refill encounter.              Passed - Medication is active on med list          "

## 2020-03-02 NOTE — TELEPHONE ENCOUNTER
Patient called stating that she got a call and would like a call back to discuss this again at: [phone].      Thank You,  Herbert Meyer

## 2020-03-02 NOTE — TELEPHONE ENCOUNTER
Panel Management Review  Summary:    Type of outreach:    Patient stated that she is unable to come in at this moment because of the current outbreak Covid-19    Encounter routed to No Action Needed.                                                                                                                               Sonja Culver CMA on 3/12/2020 at 11:58 AM       Panel Management Review      Patient has the following on her problem list:     Diabetes    ASA: unsure    Last A1C  Lab Results   Component Value Date    A1C 6.6 01/08/2019    A1C 6.5 08/29/2018    A1C 6.3 10/13/2017    A1C 6.8 07/19/2012    A1C 6.5 09/16/2010     A1C tested: FAILED    Last LDL:    Lab Results   Component Value Date    CHOL 207 08/29/2018     Lab Results   Component Value Date    HDL 50 08/29/2018     Lab Results   Component Value Date     08/29/2018     Lab Results   Component Value Date    TRIG 194 08/29/2018     Lab Results   Component Value Date    CHOLHDLRATIO 3.3 07/19/2012     Lab Results   Component Value Date    NHDL 157 08/29/2018       Is the patient on a Statin? NO             Is the patient on Aspirin? YES    Medications     Salicylates     ASPIRIN PO             Last three blood pressure readings:  BP Readings from Last 3 Encounters:   07/29/19 150/72   01/08/19 130/78   08/29/18 148/74       Date of last diabetes office visit: 01-     Tobacco History:     History   Smoking Status     Former Smoker     Quit date: 9/16/1980   Smokeless Tobacco     Never Used         Hypertension   Last three blood pressure readings:  BP Readings from Last 3 Encounters:   07/29/19 150/72   01/08/19 130/78   08/29/18 148/74     Blood pressure: FAILED    HTN Guidelines:  Less than 140/90      Composite cancer screening  Chart review shows that this patient is due/due soon for the following Fecal Colorectal (FIT)  Summary:    Patient is due/failing the following:   A1C, FIT, LDL and PHYSICAL    Action needed:   Patient  needs office visit for Wellness Check. and Patient needs referral/order: FIT testing    Type of outreach:    Phone, left message for patient to call back.  and Sent letter.    Questions for provider review:    None                                                                                                                                    Sonja Culver CMA on 3/2/2020 at 2:05 PM       Chart routed to Care Team .

## 2020-03-03 RX ORDER — ATENOLOL 50 MG/1
50 TABLET ORAL DAILY
Qty: 90 TABLET | Refills: 1 | Status: SHIPPED | OUTPATIENT
Start: 2020-03-03 | End: 2020-08-07

## 2020-03-03 NOTE — TELEPHONE ENCOUNTER
Routing refill request to provider for review/approval because:  Failed BP.  Patricia Ramires, RN,BSN  [facility]

## 2020-04-14 ENCOUNTER — TELEPHONE (OUTPATIENT)
Dept: FAMILY MEDICINE | Facility: CLINIC | Age: 70
End: 2020-04-14

## 2020-04-14 NOTE — TELEPHONE ENCOUNTER
I called patient and advised her of supportive cares, clear liquids and bland diet.   Isolate for 7 days from start of symptoms and/or 3 days of being fever free.    Patient verbalized understanding of and agreement with plan.    Betty Duran RN  [facility]

## 2020-04-14 NOTE — TELEPHONE ENCOUNTER
Reason for call:  Patient reporting a symptom    Symptom or request:  Symptom    Duration (how long have symptoms been present):  Yesterday     Have you been treated for this before? No    Additional comments:  Slight cough/diarrhea she resting and drinking lot of water.  Wondering if it's COVID19 wanted to let you know and what to do.    Phone Number patient can be reached at:  Home number on file [phone] (home)    Best Time:   Anytime     Can we leave a detailed message on this number:  YES    Call taken on 4/14/2020 at 8:43 AM by Ana Teixeira

## 2020-04-14 NOTE — TELEPHONE ENCOUNTER
With advised with supportive care, increase fluid intake,  If patient start having fever, difficulty breathing to call the office again.

## 2020-08-06 DIAGNOSIS — I25.10 CORONARY ARTERY DISEASE INVOLVING NATIVE HEART WITHOUT ANGINA PECTORIS, UNSPECIFIED VESSEL OR LESION TYPE: ICD-10-CM

## 2020-08-07 RX ORDER — ATENOLOL 50 MG/1
50 TABLET ORAL DAILY
Qty: 90 TABLET | Refills: 1 | Status: SHIPPED | OUTPATIENT
Start: 2020-08-07 | End: 2020-11-12

## 2020-08-07 NOTE — TELEPHONE ENCOUNTER
"Requested Prescriptions   Pending Prescriptions Disp Refills    atenolol (TENORMIN) 50 MG tablet 90 tablet 1     Sig: Take 1 tablet (50 mg) by mouth daily       Beta-Blockers Protocol Failed - 8/6/2020  3:55 PM        Failed - Blood pressure under 140/90 in past 12 months     BP Readings from Last 3 Encounters:   07/29/19 150/72   01/08/19 130/78   08/29/18 148/74                 Failed - Recent (12 mo) or future (30 days) visit within the authorizing provider's specialty     Patient has had an office visit with the authorizing provider or a provider within the authorizing providers department within the previous 12 mos or has a future within next 30 days. See \"Patient Info\" tab in inbasket, or \"Choose Columns\" in Meds & Orders section of the refill encounter.              Passed - Patient is age 6 or older        Passed - Medication is active on med list           Routing refill request to provider for review/approval because:  Failed protocol.  Patricia Ramires RN,BSN  [facility]    "

## 2020-09-03 ENCOUNTER — TELEPHONE (OUTPATIENT)
Dept: FAMILY MEDICINE | Facility: CLINIC | Age: 70
End: 2020-09-03

## 2020-09-03 ENCOUNTER — VIRTUAL VISIT (OUTPATIENT)
Dept: URGENT CARE | Facility: CLINIC | Age: 70
End: 2020-09-03
Payer: COMMERCIAL

## 2020-09-03 DIAGNOSIS — R42 DIZZINESS: Primary | ICD-10-CM

## 2020-09-03 PROCEDURE — 99214 OFFICE O/P EST MOD 30 MIN: CPT | Mod: 95 | Performed by: EMERGENCY MEDICINE

## 2020-09-03 RX ORDER — MECLIZINE HYDROCHLORIDE 25 MG/1
25 TABLET ORAL 3 TIMES DAILY PRN
Qty: 20 TABLET | Refills: 0 | Status: SHIPPED | OUTPATIENT
Start: 2020-09-03

## 2020-09-03 NOTE — TELEPHONE ENCOUNTER
Called patient.  She stated that for the last few days she has had the below symptoms.    Chills  Cough  New muscle or body aches  New headache  Sore throat  Congestion or runny nose  Slight dizziness if she turns her head too fast     Cancelled appointment with Dr. Mercedes for 9/8/20.  There are not any openings in clinic.  VU appointment scheduled for today at 2:30    Patient stated understanding and agreeable with the plan of care. Patricia Ramires,RN,BSN, [facility] Triage.

## 2020-09-03 NOTE — TELEPHONE ENCOUNTER
Patient returned call to RN line and left message for call back to her.  Betty Duran RN  [facility]

## 2020-09-03 NOTE — PROGRESS NOTES
HPI: Patient is a 70-year-old female who has had multiple symptoms over the last few days which includes chills, slight cough, body aches, headache, slight sore throat, nasal congestion and dizziness with head movement.  She is also had slight fatigue..  No shortness of breath, fever, or obvious cold exposure.  She is not had any chronic vertigo or dizziness of this nature in the past.  No focal sensory or motor complaints.      ROS: See HPI otherwise normal.    Allergies   Allergen Reactions     Lisinopril      Possible rash     Metformin      Possible rash      Current Outpatient Medications   Medication Sig Dispense Refill     meclizine (ANTIVERT) 25 MG tablet Take 1 tablet (25 mg) by mouth 3 times daily as needed for dizziness 20 tablet 0     ACE/ARB/ARNI NOT PRESCRIBED, INTENTIONAL, Please choose reason not prescribed, below       amoxicillin (AMOXIL) 875 MG tablet Take 1 tablet (875 mg) by mouth 2 times daily 20 tablet 0     ASPIRIN PO Take 81 mg by mouth       atenolol (TENORMIN) 50 MG tablet Take 1 tablet (50 mg) by mouth daily 90 tablet 1     cholecalciferol (VITAMIN D3) 400 unit (10 mcg) TABS tablet Take 1 tablet (400 Units) by mouth twice a week       fish oil-omega-3 fatty acids (FISH OIL) 1000 MG capsule Take 2 g by mouth daily.       nitroGLYcerin (NITROSTAT) 0.4 MG sublingual tablet Place 1 tablet (0.4 mg) under the tongue every 5 minutes as needed 25 tablet 0     order for DME Equipment being ordered: BP machine. (Patient not taking: Reported on 7/29/2019) 1 Device 0         PE: No acute distress over the phone.  Nondyspneic sounding and normal speech pattern.        TREATMENT: None.      ASSESSMENT: Multiple symptoms suspicious for COVID infection in a 70-year-old female that appears in no acute distress.  Patient is declining testing even though I have highly recommended she be tested for COVID.      DIAGNOSIS: Dizziness, URI      PLAN: Meclizine as instructed.  Hydrate and rest.  Follow-up with  Dr. Mercedes in 1 week if the dizziness continues, sooner if worse.  She also should contact her physician or reconnect with this phone appointment service if her symptoms be get worse and she agrees to be tested for COVID.    Time: 15 minutes

## 2020-09-03 NOTE — TELEPHONE ENCOUNTER
Attempt # 1   Called [phone]    Did patient answer the phone: No, left a message on voicemail to return call to triage line at [phone].    Patricia RamiresRN,BSN  [facility]

## 2020-11-12 DIAGNOSIS — I25.10 CORONARY ARTERY DISEASE INVOLVING NATIVE HEART WITHOUT ANGINA PECTORIS, UNSPECIFIED VESSEL OR LESION TYPE: ICD-10-CM

## 2020-11-12 RX ORDER — ATENOLOL 50 MG/1
50 TABLET ORAL DAILY
Qty: 90 TABLET | Refills: 0 | Status: SHIPPED | OUTPATIENT
Start: 2020-11-12

## 2020-11-12 NOTE — TELEPHONE ENCOUNTER
Pt will be unable to follow Dr Mercedes to [place] as they have limited mobility. She is hoping that Dr Mercedes will extend her refills through the winter months.   Thanks!

## 2020-11-12 NOTE — TELEPHONE ENCOUNTER
"Requested Prescriptions   Pending Prescriptions Disp Refills    atenolol (TENORMIN) 50 MG tablet 90 tablet 1     Sig: Take 1 tablet (50 mg) by mouth daily       Beta-Blockers Protocol Failed - 11/12/2020 10:42 AM        Failed - Blood pressure under 140/90 in past 12 months     BP Readings from Last 3 Encounters:   07/29/19 150/72   01/08/19 130/78   08/29/18 148/74                 Failed - Recent (12 mo) or future (30 days) visit within the authorizing provider's specialty     Patient has had an office visit with the authorizing provider or a provider within the authorizing providers department within the previous 12 mos or has a future within next 30 days. See \"Patient Info\" tab in inbasket, or \"Choose Columns\" in Meds & Orders section of the refill encounter.              Passed - Patient is age 6 or older        Passed - Medication is active on med list           Routing refill request to provider for review/approval because:  Failed protocol.  Patricia Ramires BSN-RN  [facility]    "

## 2020-12-13 ENCOUNTER — HEALTH MAINTENANCE LETTER (OUTPATIENT)
Age: 70
End: 2020-12-13

## 2021-04-17 ENCOUNTER — HEALTH MAINTENANCE LETTER (OUTPATIENT)
Age: 71
End: 2021-04-17

## 2021-08-01 ENCOUNTER — HEALTH MAINTENANCE LETTER (OUTPATIENT)
Age: 71
End: 2021-08-01

## 2021-09-26 ENCOUNTER — HEALTH MAINTENANCE LETTER (OUTPATIENT)
Age: 71
End: 2021-09-26

## 2022-03-13 ENCOUNTER — HEALTH MAINTENANCE LETTER (OUTPATIENT)
Age: 72
End: 2022-03-13

## 2022-05-08 ENCOUNTER — HEALTH MAINTENANCE LETTER (OUTPATIENT)
Age: 72
End: 2022-05-08

## 2023-04-23 ENCOUNTER — HEALTH MAINTENANCE LETTER (OUTPATIENT)
Age: 73
End: 2023-04-23

## 2023-06-02 ENCOUNTER — HEALTH MAINTENANCE LETTER (OUTPATIENT)
Age: 73
End: 2023-06-02

## 2023-12-03 ENCOUNTER — HEALTH MAINTENANCE LETTER (OUTPATIENT)
Age: 73
End: 2023-12-03

## 2025-04-29 NOTE — LETTER
March 2, 2020    Deanna Butler  [address] [place] [address] [address]  [facility] [address]    Dear Deanna    We care about your health and have reviewed your health plan. We have reviewed your medical conditions, medication list, and lab results and are making recommendations based on this review, to better manage your health.    You are in particular need of attention regarding:  - Completing a Colon Cancer Screening (FIT) - Please complete FIT test can be picked up at the clinic and mail completed test to clinic.  - Scheduling a Wellness (Physical/Lab) Visit age 65 and older [phone]      Here is a list of Health Maintenance topics that are due now or due soon:  Health Maintenance Due   Topic Date Due     DEXA  1950     ADVANCE CARE PLANNING  1950     EYE EXAM  1950     FIT  03/08/1959     DTAP/TDAP/TD IMMUNIZATION (1 - Tdap) 03/08/1960     ZOSTER IMMUNIZATION (1 of 2) 03/08/1999     MICROALBUMIN  07/19/2013     MEDICARE ANNUAL WELLNESS VISIT  03/08/2014     PNEUMOCOCCAL IMMUNIZATION 65+ LOW/MEDIUM RISK (1 of 2 - PCV13) 03/08/2014     DIABETIC FOOT EXAM  10/13/2018     FALL RISK ASSESSMENT  11/15/2018     A1C  07/08/2019     LIPID  08/29/2019     INFLUENZA VACCINE (1) 09/01/2019     PHQ-2  01/01/2020     BMP  01/08/2020       Please call us at [phone] (or use [employer]) to address the above recommendations. If we do not hear from you in the next couple of weeks we will be reaching out to you again.    Thank you for trusting [facility] and we appreciate the opportunity to serve you.  We look forward to supporting your healthcare needs in the future.    Healthy Regards,    Your Care Team      Team 2              
in ASU: